# Patient Record
Sex: FEMALE | Race: BLACK OR AFRICAN AMERICAN | NOT HISPANIC OR LATINO | URBAN - METROPOLITAN AREA
[De-identification: names, ages, dates, MRNs, and addresses within clinical notes are randomized per-mention and may not be internally consistent; named-entity substitution may affect disease eponyms.]

---

## 2017-05-26 ENCOUNTER — INPATIENT (INPATIENT)
Facility: HOSPITAL | Age: 57
LOS: 6 days | Discharge: PSYCHIATRIC OR SUBSTANCE USE FACILITY | End: 2017-06-02
Attending: INTERNAL MEDICINE | Admitting: INTERNAL MEDICINE
Payer: MEDICARE

## 2017-05-26 VITALS
OXYGEN SATURATION: 100 % | HEART RATE: 60 BPM | SYSTOLIC BLOOD PRESSURE: 117 MMHG | RESPIRATION RATE: 16 BRPM | DIASTOLIC BLOOD PRESSURE: 63 MMHG | TEMPERATURE: 99 F

## 2017-05-26 LAB
ALBUMIN SERPL ELPH-MCNC: 4 G/DL — SIGNIFICANT CHANGE UP (ref 3.3–5)
ALP SERPL-CCNC: 81 U/L — SIGNIFICANT CHANGE UP (ref 40–120)
ALT FLD-CCNC: 21 U/L — SIGNIFICANT CHANGE UP (ref 4–33)
AST SERPL-CCNC: 29 U/L — SIGNIFICANT CHANGE UP (ref 4–32)
BASOPHILS # BLD AUTO: 0.05 K/UL — SIGNIFICANT CHANGE UP (ref 0–0.2)
BASOPHILS NFR BLD AUTO: 0.5 % — SIGNIFICANT CHANGE UP (ref 0–2)
BILIRUB SERPL-MCNC: 0.3 MG/DL — SIGNIFICANT CHANGE UP (ref 0.2–1.2)
BUN SERPL-MCNC: 36 MG/DL — HIGH (ref 7–23)
CALCIUM SERPL-MCNC: 9.1 MG/DL — SIGNIFICANT CHANGE UP (ref 8.4–10.5)
CHLORIDE SERPL-SCNC: 96 MMOL/L — LOW (ref 98–107)
CK MB BLD-MCNC: 1.8 — SIGNIFICANT CHANGE UP (ref 0–2.5)
CK MB BLD-MCNC: 4.29 NG/ML — SIGNIFICANT CHANGE UP (ref 1–4.7)
CK SERPL-CCNC: 235 U/L — HIGH (ref 25–170)
CO2 SERPL-SCNC: 34 MMOL/L — HIGH (ref 22–31)
CREAT SERPL-MCNC: 1.15 MG/DL — SIGNIFICANT CHANGE UP (ref 0.5–1.3)
EOSINOPHIL # BLD AUTO: 0.22 K/UL — SIGNIFICANT CHANGE UP (ref 0–0.5)
EOSINOPHIL NFR BLD AUTO: 2.1 % — SIGNIFICANT CHANGE UP (ref 0–6)
GLUCOSE SERPL-MCNC: 105 MG/DL — HIGH (ref 70–99)
HCT VFR BLD CALC: 41.5 % — SIGNIFICANT CHANGE UP (ref 34.5–45)
HGB BLD-MCNC: 13.1 G/DL — SIGNIFICANT CHANGE UP (ref 11.5–15.5)
IMM GRANULOCYTES NFR BLD AUTO: 0.2 % — SIGNIFICANT CHANGE UP (ref 0–1.5)
LIDOCAIN IGE QN: 96.5 U/L — HIGH (ref 7–60)
LYMPHOCYTES # BLD AUTO: 29.1 % — SIGNIFICANT CHANGE UP (ref 13–44)
LYMPHOCYTES # BLD AUTO: 3.03 K/UL — SIGNIFICANT CHANGE UP (ref 1–3.3)
MCHC RBC-ENTMCNC: 29.4 PG — SIGNIFICANT CHANGE UP (ref 27–34)
MCHC RBC-ENTMCNC: 31.6 % — LOW (ref 32–36)
MCV RBC AUTO: 93.3 FL — SIGNIFICANT CHANGE UP (ref 80–100)
MONOCYTES # BLD AUTO: 1.12 K/UL — HIGH (ref 0–0.9)
MONOCYTES NFR BLD AUTO: 10.7 % — SIGNIFICANT CHANGE UP (ref 2–14)
NEUTROPHILS # BLD AUTO: 5.98 K/UL — SIGNIFICANT CHANGE UP (ref 1.8–7.4)
NEUTROPHILS NFR BLD AUTO: 57.4 % — SIGNIFICANT CHANGE UP (ref 43–77)
PLATELET # BLD AUTO: 232 K/UL — SIGNIFICANT CHANGE UP (ref 150–400)
PMV BLD: 11.3 FL — SIGNIFICANT CHANGE UP (ref 7–13)
POTASSIUM SERPL-MCNC: 3.1 MMOL/L — LOW (ref 3.5–5.3)
POTASSIUM SERPL-SCNC: 3.1 MMOL/L — LOW (ref 3.5–5.3)
PROT SERPL-MCNC: 7.7 G/DL — SIGNIFICANT CHANGE UP (ref 6–8.3)
RBC # BLD: 4.45 M/UL — SIGNIFICANT CHANGE UP (ref 3.8–5.2)
RBC # FLD: 13.7 % — SIGNIFICANT CHANGE UP (ref 10.3–14.5)
SODIUM SERPL-SCNC: 141 MMOL/L — SIGNIFICANT CHANGE UP (ref 135–145)
TROPONIN T SERPL-MCNC: < 0.06 NG/ML — SIGNIFICANT CHANGE UP (ref 0–0.06)
WBC # BLD: 10.42 K/UL — SIGNIFICANT CHANGE UP (ref 3.8–10.5)
WBC # FLD AUTO: 10.42 K/UL — SIGNIFICANT CHANGE UP (ref 3.8–10.5)

## 2017-05-26 NOTE — ED ADULT NURSE NOTE - OBJECTIVE STATEMENT
Patient a&ox4, NAD, arrived to ED room 18. Medical history Afib on warfarin, asthma previously intubated, Patient a&ox4, NAD, arrived to ED room 18. Medical history Afib on warfarin, CHF, asthma previously intubated, open heart surgery, hernia repair. Patient states abdominal/chest pain/ belching/ distended x 1 month, appears distended, no increased pain with palpation. No SOB, dizziness, nausea, emesis, constipation, or diarrhea. IV placed, labs drawn and sent to lab. AFIB on cardiac monitor.

## 2017-05-26 NOTE — ED ADULT TRIAGE NOTE - CHIEF COMPLAINT QUOTE
Pt arrives w/ c/o gas pain to stomach and chest and she gets relief from belching. Denies nausea or vomiting.

## 2017-05-27 DIAGNOSIS — R07.9 CHEST PAIN, UNSPECIFIED: ICD-10-CM

## 2017-05-27 DIAGNOSIS — E87.6 HYPOKALEMIA: ICD-10-CM

## 2017-05-27 DIAGNOSIS — Z95.1 PRESENCE OF AORTOCORONARY BYPASS GRAFT: Chronic | ICD-10-CM

## 2017-05-27 DIAGNOSIS — I48.91 UNSPECIFIED ATRIAL FIBRILLATION: ICD-10-CM

## 2017-05-27 DIAGNOSIS — I24.9 ACUTE ISCHEMIC HEART DISEASE, UNSPECIFIED: ICD-10-CM

## 2017-05-27 DIAGNOSIS — Z29.9 ENCOUNTER FOR PROPHYLACTIC MEASURES, UNSPECIFIED: ICD-10-CM

## 2017-05-27 DIAGNOSIS — I50.9 HEART FAILURE, UNSPECIFIED: ICD-10-CM

## 2017-05-27 DIAGNOSIS — I25.10 ATHEROSCLEROTIC HEART DISEASE OF NATIVE CORONARY ARTERY WITHOUT ANGINA PECTORIS: ICD-10-CM

## 2017-05-27 LAB
APTT BLD: 47.7 SEC — HIGH (ref 27.5–37.4)
APTT BLD: 48.6 SEC — HIGH (ref 27.5–37.4)
BUN SERPL-MCNC: 34 MG/DL — HIGH (ref 7–23)
CALCIUM SERPL-MCNC: 9.4 MG/DL — SIGNIFICANT CHANGE UP (ref 8.4–10.5)
CHLORIDE SERPL-SCNC: 97 MMOL/L — LOW (ref 98–107)
CHOLEST SERPL-MCNC: 185 MG/DL — SIGNIFICANT CHANGE UP (ref 120–199)
CK MB BLD-MCNC: 3.94 NG/ML — SIGNIFICANT CHANGE UP (ref 1–4.7)
CK SERPL-CCNC: 231 U/L — HIGH (ref 25–170)
CO2 SERPL-SCNC: 31 MMOL/L — SIGNIFICANT CHANGE UP (ref 22–31)
CREAT SERPL-MCNC: 1.07 MG/DL — SIGNIFICANT CHANGE UP (ref 0.5–1.3)
GLUCOSE SERPL-MCNC: 105 MG/DL — HIGH (ref 70–99)
HBA1C BLD-MCNC: 6.3 % — HIGH (ref 4–5.6)
HCT VFR BLD CALC: 42.5 % — SIGNIFICANT CHANGE UP (ref 34.5–45)
HDLC SERPL-MCNC: 49 MG/DL — SIGNIFICANT CHANGE UP (ref 45–65)
HGB BLD-MCNC: 13.2 G/DL — SIGNIFICANT CHANGE UP (ref 11.5–15.5)
INR BLD: 2.67 — HIGH (ref 0.88–1.17)
INR BLD: 2.87 — HIGH (ref 0.88–1.17)
LIPID PNL WITH DIRECT LDL SERPL: 128 MG/DL — SIGNIFICANT CHANGE UP
MAGNESIUM SERPL-MCNC: 2.8 MG/DL — HIGH (ref 1.6–2.6)
MCHC RBC-ENTMCNC: 28.9 PG — SIGNIFICANT CHANGE UP (ref 27–34)
MCHC RBC-ENTMCNC: 31.1 % — LOW (ref 32–36)
MCV RBC AUTO: 93 FL — SIGNIFICANT CHANGE UP (ref 80–100)
NT-PROBNP SERPL-SCNC: 325.9 PG/ML — SIGNIFICANT CHANGE UP
PHOSPHATE SERPL-MCNC: 3.2 MG/DL — SIGNIFICANT CHANGE UP (ref 2.5–4.5)
PLATELET # BLD AUTO: 237 K/UL — SIGNIFICANT CHANGE UP (ref 150–400)
PMV BLD: 11.5 FL — SIGNIFICANT CHANGE UP (ref 7–13)
POTASSIUM SERPL-MCNC: 3.9 MMOL/L — SIGNIFICANT CHANGE UP (ref 3.5–5.3)
POTASSIUM SERPL-SCNC: 3.9 MMOL/L — SIGNIFICANT CHANGE UP (ref 3.5–5.3)
PROTHROM AB SERPL-ACNC: 30.5 SEC — HIGH (ref 9.8–13.1)
PROTHROM AB SERPL-ACNC: 32.8 SEC — HIGH (ref 9.8–13.1)
RBC # BLD: 4.57 M/UL — SIGNIFICANT CHANGE UP (ref 3.8–5.2)
RBC # FLD: 14.1 % — SIGNIFICANT CHANGE UP (ref 10.3–14.5)
SODIUM SERPL-SCNC: 141 MMOL/L — SIGNIFICANT CHANGE UP (ref 135–145)
TRIGL SERPL-MCNC: 142 MG/DL — SIGNIFICANT CHANGE UP (ref 10–149)
TROPONIN T SERPL-MCNC: < 0.06 NG/ML — SIGNIFICANT CHANGE UP (ref 0–0.06)
TSH SERPL-MCNC: 2.24 UIU/ML — SIGNIFICANT CHANGE UP (ref 0.27–4.2)
WBC # BLD: 7.8 K/UL — SIGNIFICANT CHANGE UP (ref 3.8–10.5)
WBC # FLD AUTO: 7.8 K/UL — SIGNIFICANT CHANGE UP (ref 3.8–10.5)

## 2017-05-27 PROCEDURE — 71020: CPT | Mod: 26

## 2017-05-27 RX ORDER — PANTOPRAZOLE SODIUM 20 MG/1
40 TABLET, DELAYED RELEASE ORAL EVERY 12 HOURS
Qty: 0 | Refills: 0 | Status: DISCONTINUED | OUTPATIENT
Start: 2017-05-27 | End: 2017-06-01

## 2017-05-27 RX ORDER — FUROSEMIDE 40 MG
40 TABLET ORAL DAILY
Qty: 0 | Refills: 0 | Status: DISCONTINUED | OUTPATIENT
Start: 2017-05-27 | End: 2017-06-02

## 2017-05-27 RX ORDER — PANTOPRAZOLE SODIUM 20 MG/1
40 TABLET, DELAYED RELEASE ORAL
Qty: 0 | Refills: 0 | Status: DISCONTINUED | OUTPATIENT
Start: 2017-05-27 | End: 2017-05-27

## 2017-05-27 RX ORDER — ASPIRIN/CALCIUM CARB/MAGNESIUM 324 MG
325 TABLET ORAL ONCE
Qty: 0 | Refills: 0 | Status: COMPLETED | OUTPATIENT
Start: 2017-05-27 | End: 2017-05-27

## 2017-05-27 RX ORDER — POTASSIUM CHLORIDE 20 MEQ
40 PACKET (EA) ORAL EVERY 4 HOURS
Qty: 0 | Refills: 0 | Status: COMPLETED | OUTPATIENT
Start: 2017-05-27 | End: 2017-05-27

## 2017-05-27 RX ORDER — ASPIRIN/CALCIUM CARB/MAGNESIUM 324 MG
81 TABLET ORAL DAILY
Qty: 0 | Refills: 0 | Status: DISCONTINUED | OUTPATIENT
Start: 2017-05-28 | End: 2017-06-02

## 2017-05-27 RX ORDER — WARFARIN SODIUM 2.5 MG/1
5 TABLET ORAL ONCE
Qty: 0 | Refills: 0 | Status: COMPLETED | OUTPATIENT
Start: 2017-05-27 | End: 2017-05-27

## 2017-05-27 RX ORDER — WARFARIN SODIUM 2.5 MG/1
1 TABLET ORAL
Qty: 0 | Refills: 0 | COMMUNITY

## 2017-05-27 RX ADMIN — Medication 40 MILLIEQUIVALENT(S): at 05:53

## 2017-05-27 RX ADMIN — Medication 325 MILLIGRAM(S): at 01:07

## 2017-05-27 RX ADMIN — WARFARIN SODIUM 5 MILLIGRAM(S): 2.5 TABLET ORAL at 17:09

## 2017-05-27 RX ADMIN — PANTOPRAZOLE SODIUM 40 MILLIGRAM(S): 20 TABLET, DELAYED RELEASE ORAL at 17:09

## 2017-05-27 RX ADMIN — Medication 40 MILLIGRAM(S): at 09:57

## 2017-05-27 RX ADMIN — Medication 40 MILLIEQUIVALENT(S): at 01:07

## 2017-05-27 NOTE — H&P ADULT - NSHPPHYSICALEXAM_GEN_ALL_CORE
GENERAL APPEARANCE: Well developed, well nourished, alert and cooperative, and appears to be in no acute distress.  HEAD: normocephalic.  EYES: PERRL, EOMI.   NECK: Neck supple, non-tender without lymphadenopathy, masses or thyromegaly.  CARDIAC: Normal S1 and S2. No S3, S4 or murmurs. Rhythm is regular. There is no peripheral edema, cyanosis or pallor. Extremities are warm and well perfused. Capillary refill is less than 2 seconds. No carotid bruits.  LUNGS: Clear to auscultation and percussion without rales, rhonchi, wheezing or diminished breath sounds.  ABDOMEN: Positive bowel sounds. Soft, nondistended, nontender. No guarding or rebound. No masses.  EXTREMITIES: No significant deformity or joint abnormality. No edema. Peripheral pulses intact.   NEUROLOGICAL: CN II-XII intact. Strength and sensation symmetric and intact throughout.   SKIN: Skin normal color, texture and turgor with no lesions or eruptions.  PSYCHIATRIC: The mental examination revealed the patient was oriented to person, place, and time. The patient was able to demonstrate good judgement and reason, without hallucinations, abnormal affect or abnormal behaviors during the examination. Patient is not suicidal.

## 2017-05-27 NOTE — H&P ADULT - ATTENDING COMMENTS
pt seen and examined  cv stable  pt with hx of CABG/AF admitted with chest pain, epigastric pain    jammie neg  ekg  af, no ischemic changes    vitals stable  nad  cv s1s2 irreg  lungs clear   abd distended soft  ext edema    A/P    CAD/CABG/AF    chest pain  abd distension    chest pain  brandon  nuclear stress test r/o new ischemic heart disease  asa  a/c for af  rate controlled  gi eval   ppi

## 2017-05-27 NOTE — ED PROVIDER NOTE - ATTENDING CONTRIBUTION TO CARE
att57 y/o f with h/o CAD, CABG, CHF, ef 20-25%, HLD, Afib on coumadin, asthma, presents with intermittent chest pain for 1 month and bloating. CP exertion, sob, cook. Belches with belatedness, but pt asking for food, no vomiting. No fever. Poor historian. Physicians in [place]. Exam as above. r/u acs, r/u chf, inr, asa, and admission. Will sign out patient.   I have personally performed a face to face bedside history and physical examination of this patient. I have discussed the history, examination, review of systems, assessment and plan of management with the resident. I have reviewed the electronic medical record and amended it to reflect my history, review of systems, physical exam, assessment and plan.

## 2017-05-27 NOTE — H&P ADULT - NSHPLABSRESULTS_GEN_ALL_CORE
13.1   10.42 )-----------( 232      ( 26 May 2017 22:30 )             41.5   05-26    141  |  96<L>  |  36<H>  ----------------------------<  105<H>  3.1<L>   |  34<H>  |  1.15    Ca    9.1      26 May 2017 22:30    TPro  7.7  /  Alb  4.0  /  TBili  0.3  /  DBili  x   /  AST  29  /  ALT  21  /  AlkPhos  81  05-26  CARDIAC MARKERS ( 26 May 2017 22:30 )  x     / < 0.06 ng/mL / 235 u/L / 4.29 ng/mL / x        EKG shows atrial fibrillation @ 65 bpm Qtc 461

## 2017-05-27 NOTE — H&P ADULT - ASSESSMENT
55 y/o F with h/o CAD s/p CABG, CHF, a. fib on coumadin, asthma presents to the ED for chset pain. Admit to telemetry to r/o ACS.

## 2017-05-27 NOTE — CONSULT NOTE ADULT - PROBLEM SELECTOR RECOMMENDATION 9
Patient with dyspepsia and history of cad.  Pt undergoing cardiac evaluation.  If negative, certainly possible this is gastritis/ reflux, nud.  I have recommended ppi bid.  If workup for cardiac negative may require endosocpy inpatient vs Outpatient.  Discussed with patient

## 2017-05-27 NOTE — H&P ADULT - HISTORY OF PRESENT ILLNESS
55 y/o F with h/o CAD, CABG, CHF, A. fib on coumadin, asthma presents to the ED for chest pain. Pt is a poor historian. Pt states she has been having intermittent chest pain for the past month. Pt report sthe chest pain is on the left side and is exertional and better with rest. Pt states it is associated with shortness of breath. Pt also states she has been burping alot and it relieves her chest pain. Pt denies LOC, syncope, fever, chills, palpitations, numbness, peripheral edema, tingling, dysuria, urinary/bowel incontinence or any other complaints at this time.

## 2017-05-27 NOTE — ED PROVIDER NOTE - PMH
Afib    CAD (coronary artery disease)    CHF (congestive heart failure) Afib    Asthma    CAD (coronary artery disease)    CHF (congestive heart failure)

## 2017-05-27 NOTE — H&P ADULT - PROBLEM SELECTOR PLAN 1
Admit to telemetry.   Trend CE. Consider ischemic evaluation.   EKG, cxr.  check cbc, tsh, lipid, hemoglobin a1c, bmp with mag and phos.   f/u MD note

## 2017-05-27 NOTE — CONSULT NOTE ADULT - SUBJECTIVE AND OBJECTIVE BOX
Patient is a 56y Female     Patient is a 56y old  Female who presents with a chief complaint of chest pain (27 May 2017 05:08)      HPI:  55 y/o F with h/o CAD, CABG, CHF, A. fib on coumadin, asthma presents to the ED for chest pain. Pt is a poor historian. Pt states she has been having intermittent chest pain for the past month. Pt report sthe chest pain is on the left side and is exertional and better with rest. Pt states it is associated with shortness of breath. Pt also states she has been burping alot and it relieves her chest pain. Pt denies LOC, syncope, fever, chills, palpitations, numbness, peripheral edema, tingling, dysuria, urinary/bowel incontinence or any other complaints at this time. (27 May 2017 05:08)      PAST MEDICAL & SURGICAL HISTORY:  Asthma  CAD (coronary artery disease)  Afib  CHF (congestive heart failure)  S/P CABG (coronary artery bypass graft)      MEDICATIONS  (STANDING):  furosemide    Tablet 40milliGRAM(s) Oral daily  warfarin 5milliGRAM(s) Oral once  pantoprazole  Injectable 40milliGRAM(s) IV Push every 12 hours      Allergies    No Known Allergies    Intolerances        SOCIAL HISTORY:  Denies ETOh,Smoking,     FAMILY HISTORY:  No pertinent family history in first degree relatives      REVIEW OF SYSTEMS:    CONSTITUTIONAL: No weakness, fevers or chills  EYES/ENT: No visual changes;  No vertigo or throat pain   NECK: No pain or stiffness  RESPIRATORY: No cough, wheezing, hemoptysis; No shortness of breath  CARDIOVASCULAR: No chest pain or palpitations  GASTROINTESTINAL: No abdominal or epigastric pain. No nausea, vomiting, or hematemesis; No diarrhea or constipation. No melena or hematochezia.  GENITOURINARY: No dysuria, frequency or hematuria  NEUROLOGICAL: No numbness or weakness  SKIN: No itching, burning, rashes, or lesions   All other review of systems is negative unless indicated above.    VITAL:  T(C): , Max: 37 (05-26 @ 20:27)  T(F): , Max: 98.6 (05-26 @ 20:27)  HR: 59  BP: 99/66  BP(mean): --  RR: 16  SpO2: 100%  Wt(kg): --    I and O's:    I & Os for current day (as of 05-27 @ 16:19)  =============================================  IN: 240 ml / OUT: 0 ml / NET: 240 ml    Height (cm): 165.1 (05-26 @ 22:24)  Weight (kg): 74.8 (05-26 @ 22:24)  BMI (kg/m2): 27.4 (05-26 @ 22:24)  BSA (m2): 1.82 (05-26 @ 22:24)    PHYSICAL EXAM:    Constitutional: NAD  HEENT: PERRLA,   Neck: No JVD  Respiratory: CTA B/L  Cardiovascular: S1 and S2  Gastrointestinal: BS+, soft, NT/ND  Extremities: No peripheral edema  Neurological: A/O x 3, no focal deficits  Psychiatric: Normal mood, normal affect  : No Tapia  Skin: No rashes  Access: Not applicable  Back: No CVA tenderness    LABS:                        13.2   7.80  )-----------( 237      ( 27 May 2017 06:00 )             42.5     05-27    141  |  97<L>  |  34<H>  ----------------------------<  105<H>  3.9   |  31  |  1.07    Ca    9.4      27 May 2017 06:00  Phos  3.2     05-27  Mg     2.8     05-27    TPro  7.7  /  Alb  4.0  /  TBili  0.3  /  DBili  x   /  AST  29  /  ALT  21  /  AlkPhos  81  05-26          RADIOLOGY & ADDITIONAL STUDIES:

## 2017-05-27 NOTE — PATIENT PROFILE ADULT. - SOCIAL CONCERNS
The patient is 7 weeks post Right Total Knee Replacement.  The patient reports doing well with less pain requiring decreased  narcotic usage.    Physical Examination  VITALS: Blood pressure 124/78, pulse 94, temperature 98.2 °F (36.8 °C), temperature source Oral, weight 86.2 kg.  The incision is dry and healing well per primum.  Range of motion 5 to 120 degrees.    The distal examination is intact.  There is swelling of none.  Calves are soft .    Radiographs show good implant position and alignment.    Impression:   Doing well post replacement as above.    Plan:  Continue physical therapy program as ordered, ween to hep as ayden.  The patient should follow up in 1 years' time for range of motion check, xrays are needed, sooner if needed.  Prescription refill as noted.        Per [place] law, The [place] Prescription Drug Monitoring website was visited, via [employer], to ensure compliance with [place] state statue.    The system was not functioning.    Rx is provided.  Patient instructed not share with or take other persons prescription medications.       None

## 2017-05-27 NOTE — ED PROVIDER NOTE - OBJECTIVE STATEMENT
56 F with PMH of CAD, CABG, AFIB on coumadin, asthma, p/w CP and bloating.  Patient poor historian, but says she has on and off CP for the last month. Pain is exertional and non exertional at times, not associated with diaphoresis but she does endorse HARDY.  Also says she has been feeling bloated and belches 56 F with PMH of CAD, CABG, CHF, AFIB on coumadin, asthma, p/w CP and bloating.  Patient poor historian, but says she has on and off CP for the last month. Pain is exertional and non exertional at times, not associated with diaphoresis but she does endorse HARDY.  Also says she has been feeling bloated and belches which makes her feel better. +BM and flatus.

## 2017-05-27 NOTE — H&P ADULT - NSHPREVIEWOFSYSTEMS_GEN_ALL_CORE
Constitutional: No fever, fatigue or weight loss.  Skin: No rash.  Eyes: No recent vision problems or eye pain.  ENT: No congestion, ear pain, or sore throat.  Endocrine: No thyroid problems.  Cardiovascular: + chest pain. No palpation.  Respiratory: No cough, shortness of breath, congestion, or wheezing.  Gastrointestinal: No abdominal pain, nausea, vomiting, or diarrhea.  Genitourinary: No dysuria.  Musculoskeletal: No joint swelling.  Neurologic: No headache.

## 2017-05-27 NOTE — ED PROVIDER NOTE - PSH
S/P CABG (coronary artery bypass graft) S/P CABG (coronary artery bypass graft)    S/P hip replacement, left    S/P thyroid surgery

## 2017-05-27 NOTE — ED PROVIDER NOTE - CARE PLAN
Principal Discharge DX:	Chest pain Principal Discharge DX:	Chest pain  Secondary Diagnosis:	CAD (coronary artery disease)  Secondary Diagnosis:	CHF (congestive heart failure)

## 2017-05-28 LAB
BUN SERPL-MCNC: 31 MG/DL — HIGH (ref 7–23)
CALCIUM SERPL-MCNC: 10 MG/DL — SIGNIFICANT CHANGE UP (ref 8.4–10.5)
CHLORIDE SERPL-SCNC: 96 MMOL/L — LOW (ref 98–107)
CO2 SERPL-SCNC: 34 MMOL/L — HIGH (ref 22–31)
CREAT SERPL-MCNC: 1.13 MG/DL — SIGNIFICANT CHANGE UP (ref 0.5–1.3)
GLUCOSE SERPL-MCNC: 99 MG/DL — SIGNIFICANT CHANGE UP (ref 70–99)
HCT VFR BLD CALC: 41.2 % — SIGNIFICANT CHANGE UP (ref 34.5–45)
HGB BLD-MCNC: 12.7 G/DL — SIGNIFICANT CHANGE UP (ref 11.5–15.5)
INR BLD: 3.03 — HIGH (ref 0.88–1.17)
MAGNESIUM SERPL-MCNC: 2.1 MG/DL — SIGNIFICANT CHANGE UP (ref 1.6–2.6)
MCHC RBC-ENTMCNC: 28.7 PG — SIGNIFICANT CHANGE UP (ref 27–34)
MCHC RBC-ENTMCNC: 30.8 % — LOW (ref 32–36)
MCV RBC AUTO: 93.2 FL — SIGNIFICANT CHANGE UP (ref 80–100)
PLATELET # BLD AUTO: 222 K/UL — SIGNIFICANT CHANGE UP (ref 150–400)
PMV BLD: 11.2 FL — SIGNIFICANT CHANGE UP (ref 7–13)
POTASSIUM SERPL-MCNC: 2.9 MMOL/L — CRITICAL LOW (ref 3.5–5.3)
POTASSIUM SERPL-MCNC: 3.5 MMOL/L — SIGNIFICANT CHANGE UP (ref 3.5–5.3)
POTASSIUM SERPL-SCNC: 2.9 MMOL/L — CRITICAL LOW (ref 3.5–5.3)
POTASSIUM SERPL-SCNC: 3.5 MMOL/L — SIGNIFICANT CHANGE UP (ref 3.5–5.3)
PROTHROM AB SERPL-ACNC: 34.7 SEC — HIGH (ref 9.8–13.1)
RBC # BLD: 4.42 M/UL — SIGNIFICANT CHANGE UP (ref 3.8–5.2)
RBC # FLD: 13.8 % — SIGNIFICANT CHANGE UP (ref 10.3–14.5)
SODIUM SERPL-SCNC: 140 MMOL/L — SIGNIFICANT CHANGE UP (ref 135–145)
WBC # BLD: 8.52 K/UL — SIGNIFICANT CHANGE UP (ref 3.8–10.5)
WBC # FLD AUTO: 8.52 K/UL — SIGNIFICANT CHANGE UP (ref 3.8–10.5)

## 2017-05-28 PROCEDURE — 78452 HT MUSCLE IMAGE SPECT MULT: CPT | Mod: 26

## 2017-05-28 PROCEDURE — 93018 CV STRESS TEST I&R ONLY: CPT | Mod: GC

## 2017-05-28 PROCEDURE — 93306 TTE W/DOPPLER COMPLETE: CPT | Mod: 26

## 2017-05-28 PROCEDURE — 93016 CV STRESS TEST SUPVJ ONLY: CPT | Mod: GC

## 2017-05-28 RX ORDER — WARFARIN SODIUM 2.5 MG/1
1 TABLET ORAL ONCE
Qty: 0 | Refills: 0 | Status: COMPLETED | OUTPATIENT
Start: 2017-05-28 | End: 2017-05-28

## 2017-05-28 RX ORDER — POTASSIUM CHLORIDE 20 MEQ
40 PACKET (EA) ORAL EVERY 4 HOURS
Qty: 0 | Refills: 0 | Status: COMPLETED | OUTPATIENT
Start: 2017-05-28 | End: 2017-05-28

## 2017-05-28 RX ORDER — POTASSIUM CHLORIDE 20 MEQ
10 PACKET (EA) ORAL
Qty: 0 | Refills: 0 | Status: COMPLETED | OUTPATIENT
Start: 2017-05-28 | End: 2017-05-28

## 2017-05-28 RX ADMIN — WARFARIN SODIUM 1 MILLIGRAM(S): 2.5 TABLET ORAL at 17:19

## 2017-05-28 RX ADMIN — Medication 40 MILLIEQUIVALENT(S): at 10:25

## 2017-05-28 RX ADMIN — Medication 40 MILLIEQUIVALENT(S): at 15:17

## 2017-05-28 RX ADMIN — Medication 100 MILLIEQUIVALENT(S): at 12:10

## 2017-05-28 RX ADMIN — PANTOPRAZOLE SODIUM 40 MILLIGRAM(S): 20 TABLET, DELAYED RELEASE ORAL at 05:34

## 2017-05-28 RX ADMIN — Medication 30 MILLILITER(S): at 23:06

## 2017-05-28 RX ADMIN — PANTOPRAZOLE SODIUM 40 MILLIGRAM(S): 20 TABLET, DELAYED RELEASE ORAL at 17:19

## 2017-05-28 RX ADMIN — Medication 81 MILLIGRAM(S): at 15:17

## 2017-05-28 RX ADMIN — Medication 100 MILLIEQUIVALENT(S): at 13:10

## 2017-05-28 RX ADMIN — Medication 40 MILLIGRAM(S): at 05:34

## 2017-05-28 RX ADMIN — Medication 100 MILLIEQUIVALENT(S): at 10:11

## 2017-05-28 NOTE — CONSULT NOTE ADULT - ASSESSMENT
55 y/o F with h/o CAD, CABG, CHF, A. fib on coumadin, asthma presents to the ED for chest pain. Pt is a poor historian. Pt states she has been having intermittent chest pain for the past month. Pt report sthe chest pain is on the left side and is exertional and better with rest. Pt states it is associated with shortness of breath. Pt also states she has been burping alot and it relieves her chest pain. Pt denies LOC, syncope, fever, chills, palpitations, numbness, peripheral edema, tingling, dysuria, urinary/bowel incontinence or any other complaints at this time.    Problem/Plan - 1:  ·  Problem: ACS (acute coronary syndrome).  Plan: Admit to telemetry.   Ischemia evaluation as per cards    Problem/Plan - 2:  ·  Problem: Hypokalemia.  Plan: Continue to monitor potassium levels.   Potassium chloride 40 meq was given. Replete as required    Problem/Plan - 3:  ·  Problem: CHF (congestive heart failure).  Plan: Continue with lasix 40 mg QD.  Strict I and O, daily weights.     Problem/Plan - 4:  ·  Problem: Afib.  Plan: [id number] score of 3. Pt is currently rate controlled. INR is therapeutic and warfarin is dosed.     Problem/Plan - 5:  ·  Problem: CAD (coronary artery disease).  Plan: Patient is not on ASA. Continue with DASH diet.     Problem/Plan - 6:  Problem: Need for prophylactic measure. Plan: Pt is anticoagulated with warfarin.

## 2017-05-28 NOTE — PROGRESS NOTE ADULT - SUBJECTIVE AND OBJECTIVE BOX
CARDIOLOGY FOLLOW UP NOTE - DR. FRTIZ    Subjective:  no active chest pain/sob    PHYSICAL EXAM:  T(C): 36.7, Max: 37 (05-27 @ 21:56)  HR: 56 (56 - 61)  BP: 103/67 (99/66 - 109/68)  RR: 16 (16 - 16)  SpO2: 97% (97% - 100%)  Wt(kg): --  I&O's Summary    I & Os for current day (as of 28 May 2017 10:09)  =============================================  IN: 480 ml / OUT: 0 ml / NET: 480 ml      Appearance: Normal	  Cardiovascular: Normal S1 S2,RRR, No JVD, No murmurs  Respiratory: Lungs clear to auscultation	  Gastrointestinal:  Soft, Non-tender, + BS	  Extremities: Normal range of motion, No clubbing, cyanosis or edema    MEDICATIONS  (STANDING):  furosemide    Tablet 40milliGRAM(s) Oral daily  aspirin enteric coated 81milliGRAM(s) Oral daily  pantoprazole  Injectable 40milliGRAM(s) IV Push every 12 hours  potassium chloride  10 mEq/100 mL IVPB 10milliEquivalent(s) IV Intermittent every 1 hour  potassium chloride    Tablet ER 40milliEquivalent(s) Oral every 4 hours  warfarin 1milliGRAM(s) Oral once      TELEMETRY: 	    ECG:  	  RADIOLOGY:   DIAGNOSTIC TESTING:  [ ] Echocardiogram:  [ ] Catheterization:  [ ] Stress Test:    OTHER: 	    LABS:	 	    CARDIAC MARKERS:                                12.7   8.52  )-----------( 222      ( 28 May 2017 06:10 )             41.2     05-28    140  |  96<L>  |  31<H>  ----------------------------<  99  2.9<LL>   |  34<H>  |  1.13    Ca    10.0      28 May 2017 06:10  Phos  3.2     05-27  Mg     2.1     05-28    TPro  7.7  /  Alb  4.0  /  TBili  0.3  /  DBili  x   /  AST  29  /  ALT  21  /  AlkPhos  81  05-26    proBNP:   PT/INR - ( 28 May 2017 06:10 )   PT: 34.7 SEC;   INR: 3.03          PTT - ( 27 May 2017 10:20 )  PTT:48.6 SEC  Lipid Profile:   HgA1c:

## 2017-05-28 NOTE — CONSULT NOTE ADULT - SUBJECTIVE AND OBJECTIVE BOX
Chief Complaint/Reason for Admission: chest pain	  History of Present Illness: 	  55 y/o F with h/o CAD, CABG, CHF, A. fib on coumadin, asthma presents to the ED for chest pain. Pt is a poor historian. Pt states she has been having intermittent chest pain for the past month. Pt report sthe chest pain is on the left side and is exertional and better with rest. Pt states it is associated with shortness of breath. Pt also states she has been burping alot and it relieves her chest pain. Pt denies LOC, syncope, fever, chills, palpitations, numbness, peripheral edema, tingling, dysuria, urinary/bowel incontinence or any other complaints at this time.  Review of Systems:  Review of Systems: Constitutional: No fever, fatigue or weight loss. Skin: No rash. Eyes: No recent vision problems or eye pain. ENT: No congestion, ear pain, or sore throat. Endocrine: No thyroid problems. Cardiovascular: + chest pain. No palpation. Respiratory: No cough, shortness of breath, congestion, or wheezing. Gastrointestinal: No abdominal pain, nausea, vomiting, or diarrhea. Genitourinary: No dysuria. Musculoskeletal: No joint swelling. Neurologic: No headache.	      Allergies and Intolerances:        Allergies:  	No Known Allergies:     Home Medications:   * Patient Currently Takes Medications as of 27-May-2017 03:07 documented in Prescription Writer  · 	warfarin 5 mg oral tablet: 1 tab(s) orally once a day, Last Dose Taken:    · 	Lasix 40 mg oral tablet: 1 tab(s) orally once a day, Last Dose Taken:  Patient History:   Past Medical History:  Afib    Asthma    CAD (coronary artery disease)    CHF (congestive heart failure).    Past Surgical History:  S/P CABG (coronary artery bypass graft).    Family History:  No pertinent family history in first degree relatives.Social History:  Social History (marital status, living situation, occupation, tobacco use, alcohol and drug use, and sexual history): denies smoking, alcohol or drug use.	  Physical Exam:  Physical Exam: GENERAL APPEARANCE: Well developed, well nourished, alert and cooperative, and appears to be in no acute distress. HEAD: normocephalic. EYES: PERRL, EOMI.  NECK: Neck supple, non-tender without lymphadenopathy, masses or thyromegaly. CARDIAC: Normal S1 and S2. No S3, S4 or murmurs. Rhythm is regular. There is no peripheral edema, cyanosis or pallor. Extremities are warm and well perfused. Capillary refill is less than 2 seconds. No carotid bruits. LUNGS: Clear to auscultation and percussion without rales, rhonchi, wheezing or diminished breath sounds. ABDOMEN: Positive bowel sounds. Soft, nondistended, nontender. No guarding or rebound. No masses. EXTREMITIES: No significant deformity or joint abnormality. No edema. Peripheral pulses intact.  NEUROLOGICAL: CN II-XII intact. Strength and sensation symmetric and intact throughout.  SKIN: Skin normal color, texture and turgor with no lesions or eruptions. PSYCHIATRIC: The mental examination revealed the patient was oriented to person, place, and time. The patient was able to demonstrate good judgement and reason, without hallucinations, abnormal affect or abnormal behaviors during the examination. Patient is not suicidal.	  Labs, Radiology, Cardiology, and Other Results: 13.1  10.42 )-----------( 232      ( 26 May 2017 22:30 )            41.5  05-26  141  |  96<L>  |  36<H> ----------------------------<  105<H> 3.1<L>   |  34<H>  |  1.15  Ca    9.1      26 May 2017 22:30  TPro  7.7  /  Alb  4.0  /  TBili  0.3  /  DBili  x   /  AST  29  /  ALT  21  /  AlkPhos  81  05-26 CARDIAC MARKERS ( 26 May 2017 22:30 ) x     / < 0.06 ng/mL / 235 u/L / 4.29 ng/mL / x      EKG shows atrial fibrillation @ 65 bpm Qtc 461

## 2017-05-29 DIAGNOSIS — Z96.642 PRESENCE OF LEFT ARTIFICIAL HIP JOINT: Chronic | ICD-10-CM

## 2017-05-29 DIAGNOSIS — Z98.890 OTHER SPECIFIED POSTPROCEDURAL STATES: Chronic | ICD-10-CM

## 2017-05-29 LAB
BUN SERPL-MCNC: 34 MG/DL — HIGH (ref 7–23)
CALCIUM SERPL-MCNC: 10.2 MG/DL — SIGNIFICANT CHANGE UP (ref 8.4–10.5)
CHLORIDE SERPL-SCNC: 98 MMOL/L — SIGNIFICANT CHANGE UP (ref 98–107)
CO2 SERPL-SCNC: 28 MMOL/L — SIGNIFICANT CHANGE UP (ref 22–31)
CREAT SERPL-MCNC: 1.12 MG/DL — SIGNIFICANT CHANGE UP (ref 0.5–1.3)
GLUCOSE SERPL-MCNC: 92 MG/DL — SIGNIFICANT CHANGE UP (ref 70–99)
INR BLD: 2.52 — HIGH (ref 0.88–1.17)
MAGNESIUM SERPL-MCNC: 2.1 MG/DL — SIGNIFICANT CHANGE UP (ref 1.6–2.6)
POTASSIUM SERPL-MCNC: 3.9 MMOL/L — SIGNIFICANT CHANGE UP (ref 3.5–5.3)
POTASSIUM SERPL-SCNC: 3.9 MMOL/L — SIGNIFICANT CHANGE UP (ref 3.5–5.3)
PROTHROM AB SERPL-ACNC: 28.8 SEC — HIGH (ref 9.8–13.1)
SODIUM SERPL-SCNC: 140 MMOL/L — SIGNIFICANT CHANGE UP (ref 135–145)

## 2017-05-29 RX ORDER — WARFARIN SODIUM 2.5 MG/1
5 TABLET ORAL ONCE
Qty: 0 | Refills: 0 | Status: COMPLETED | OUTPATIENT
Start: 2017-05-29 | End: 2017-05-29

## 2017-05-29 RX ADMIN — Medication 81 MILLIGRAM(S): at 12:18

## 2017-05-29 RX ADMIN — PANTOPRAZOLE SODIUM 40 MILLIGRAM(S): 20 TABLET, DELAYED RELEASE ORAL at 06:07

## 2017-05-29 RX ADMIN — Medication 40 MILLIGRAM(S): at 06:07

## 2017-05-29 RX ADMIN — PANTOPRAZOLE SODIUM 40 MILLIGRAM(S): 20 TABLET, DELAYED RELEASE ORAL at 17:41

## 2017-05-29 RX ADMIN — WARFARIN SODIUM 5 MILLIGRAM(S): 2.5 TABLET ORAL at 17:41

## 2017-05-29 NOTE — PROGRESS NOTE ADULT - SUBJECTIVE AND OBJECTIVE BOX
Patient is a 56y Female     Patient is a 56y old  Female who presents with a chief complaint of chest pain (27 May 2017 05:08)      HPI:  55 y/o F with h/o CAD, CABG, CHF, A. fib on coumadin, asthma presents to the ED for chest pain. Pt is a poor historian. Pt states she has been having intermittent chest pain for the past month. Pt report sthe chest pain is on the left side and is exertional and better with rest. Pt states it is associated with shortness of breath. Pt also states she has been burping alot and it relieves her chest pain. Pt denies LOC, syncope, fever, chills, palpitations, numbness, peripheral edema, tingling, dysuria, urinary/bowel incontinence or any other complaints at this time. (27 May 2017 05:08)      PAST MEDICAL & SURGICAL HISTORY:  Asthma  CAD (coronary artery disease)  Afib  CHF (congestive heart failure)  S/P CABG (coronary artery bypass graft)      MEDICATIONS  (STANDING):  furosemide    Tablet 40milliGRAM(s) Oral daily  aspirin enteric coated 81milliGRAM(s) Oral daily  pantoprazole  Injectable 40milliGRAM(s) IV Push every 12 hours      Allergies    No Known Allergies    Intolerances        SOCIAL HISTORY:  Denies ETOh,Smoking,     FAMILY HISTORY:  No pertinent family history in first degree relatives      REVIEW OF SYSTEMS:    CONSTITUTIONAL: No weakness, fevers or chills  EYES/ENT: No visual changes;  No vertigo or throat pain   NECK: No pain or stiffness  RESPIRATORY: No cough, wheezing, hemoptysis; No shortness of breath  CARDIOVASCULAR: No chest pain or palpitations  GASTROINTESTINAL: No abdominal or epigastric pain. No nausea, vomiting, or hematemesis; No diarrhea or constipation. No melena or hematochezia.  GENITOURINARY: No dysuria, frequency or hematuria  NEUROLOGICAL: No numbness or weakness  SKIN: No itching, burning, rashes, or lesions   All other review of systems is negative unless indicated above.    VITAL:  T(C): , Max: 37.3 (05-28 @ 19:25)  T(F): , Max: 99.2 (05-28 @ 19:25)  HR: 80  BP: 104/79  BP(mean): --  RR: 12  SpO2: 97%  Wt(kg): --    I and O's:  I & Os for 24h ending 05-28 @ 07:00  =============================================  IN: 480 ml / OUT: 0 ml / NET: 480 ml    I & Os for current day (as of 05-29 @ 08:39)  =============================================  IN: 660 ml / OUT: 0 ml / NET: 660 ml        PHYSICAL EXAM:    Constitutional: NAD  HEENT: PERRLA,   Neck: No JVD  Respiratory: CTA B/L  Cardiovascular: S1 and S2  Gastrointestinal: BS+, soft, NT/ND  Extremities: No peripheral edema  Neurological: A/O x 3, no focal deficits  Psychiatric: Normal mood, normal affect  : No Tapia  Skin: No rashes  Access: Not applicable  Back: No CVA tenderness    LABS:                        12.7   8.52  )-----------( 222      ( 28 May 2017 06:10 )             41.2     05-28    x   |  x   |  x   ----------------------------<  x   3.5   |  x   |  x     Ca    10.0      28 May 2017 06:10  Mg     2.1     05-28            RADIOLOGY & ADDITIONAL STUDIES:

## 2017-05-29 NOTE — PROGRESS NOTE ADULT - SUBJECTIVE AND OBJECTIVE BOX
CARDIOLOGY FOLLOW UP NOTE - DR. FRITZ    Subjective:  no chest pain/sob    PHYSICAL EXAM:  T(C): 36.8, Max: 37.3 (05-28 @ 19:25)  HR: 80 (70 - 80)  BP: 104/79 (95/51 - 110/67)  RR: 12 (12 - 16)  SpO2: 97% (97% - 100%)  Wt(kg): --  I&O's Summary    I & Os for current day (as of 29 May 2017 09:42)  =============================================  IN: 660 ml / OUT: 0 ml / NET: 660 ml      Appearance: Normal	  Cardiovascular: Normal S1 S2,RRR, No JVD, No murmurs  Respiratory: Lungs clear to auscultation	  Gastrointestinal:  Soft, Non-tender, + BS	  Extremities: Normal range of motion, No clubbing, cyanosis or edema    MEDICATIONS  (STANDING):  furosemide    Tablet 40milliGRAM(s) Oral daily  aspirin enteric coated 81milliGRAM(s) Oral daily  pantoprazole  Injectable 40milliGRAM(s) IV Push every 12 hours  warfarin 5milliGRAM(s) Oral once      TELEMETRY: 	    ECG:  	  RADIOLOGY:   DIAGNOSTIC TESTING:  [ ] Echocardiogram:  [ ] Catheterization:  [ ] Stress Test:    OTHER: 	    LABS:	 	    CARDIAC MARKERS:                                12.7   8.52  )-----------( 222      ( 28 May 2017 06:10 )             41.2     05-29    140  |  98  |  34<H>  ----------------------------<  92  3.9   |  28  |  1.12    Ca    10.2      29 May 2017 07:30  Mg     2.1     05-29      proBNP:   PT/INR - ( 29 May 2017 07:30 )   PT: 28.8 SEC;   INR: 2.52          PTT - ( 27 May 2017 10:20 )  PTT:48.6 SEC  Lipid Profile:   HgA1c:

## 2017-05-29 NOTE — PROGRESS NOTE ADULT - SUBJECTIVE AND OBJECTIVE BOX
Patient is a 56y old  Female who presents with a chief complaint of chest pain (27 May 2017 05:08)      INTERVAL HPI/OVERNIGHT EVENTS:  T(C): 36.8, Max: 37.3 (05-28 @ 19:25)  HR: 80 (70 - 80)  BP: 104/79 (95/51 - 110/67)  RR: 12 (12 - 16)  SpO2: 97% (97% - 100%)  Wt(kg): --  I&O's Summary  I & Os for 24h ending 29 May 2017 07:00  =============================================  IN: 660 ml / OUT: 0 ml / NET: 660 ml    I & Os for current day (as of 29 May 2017 11:48)  =============================================  IN: 480 ml / OUT: 0 ml / NET: 480 ml      LABS:                        12.7   8.52  )-----------( 222      ( 28 May 2017 06:10 )             41.2     05-29    140  |  98  |  34<H>  ----------------------------<  92  3.9   |  28  |  1.12    Ca    10.2      29 May 2017 07:30  Mg     2.1     05-29      PT/INR - ( 29 May 2017 07:30 )   PT: 28.8 SEC;   INR: 2.52              CAPILLARY BLOOD GLUCOSE            MEDICATIONS  (STANDING):  furosemide    Tablet 40milliGRAM(s) Oral daily  aspirin enteric coated 81milliGRAM(s) Oral daily  pantoprazole  Injectable 40milliGRAM(s) IV Push every 12 hours  warfarin 5milliGRAM(s) Oral once    MEDICATIONS  (PRN):      REVIEW OF SYSTEMS:  CONSTITUTIONAL: No fever, weight loss, or fatigue  EYES: No eye pain, visual disturbances, or discharge  ENMT:  No difficulty hearing, tinnitus, vertigo; No sinus or throat pain  NECK: No pain or stiffness  RESPIRATORY: No cough, wheezing, chills or hemoptysis; No shortness of breath  CARDIOVASCULAR: No chest pain, palpitations, dizziness, or leg swelling  GASTROINTESTINAL: No abdominal or epigastric pain. No nausea, vomiting, or hematemesis; No diarrhea or constipation. No melena or hematochezia.  GENITOURINARY: No dysuria, frequency, hematuria, or incontinence  NEUROLOGICAL: No headaches, memory loss, loss of strength, numbness, or tremors  SKIN: No itching, burning, rashes, or lesions   LYMPH NODES: No enlarged glands  ENDOCRINE: No heat or cold intolerance; No hair loss  MUSCULOSKELETAL: No joint pain or swelling; No muscle, back, or extremity pain  PSYCHIATRIC: No depression, anxiety, mood swings, or difficulty sleeping  HEME/LYMPH: No easy bruising, or bleeding gums  ALLERY AND IMMUNOLOGIC: No hives or eczema    RADIOLOGY & ADDITIONAL TESTS:    Imaging Personally Reviewed:  [ ] YES  [ ] NO    Consultant(s) Notes Reviewed:  [ ] YES  [ ] NO    PHYSICAL EXAM:  GENERAL: NAD, well-groomed, well-developed  HEAD:  Atraumatic, Normocephalic  EYES: EOMI, PERRLA, conjunctiva and sclera clear  ENMT: No tonsillar erythema, exudates, or enlargement; Moist mucous membranes, Good dentition, No lesions  NECK: Supple, No JVD, Normal thyroid  NERVOUS SYSTEM:  Alert & Oriented X3, Good concentration; Motor Strength 5/5 B/L upper and lower extremities; DTRs 2+ intact and symmetric  CHEST/LUNG: Clear to percussion bilaterally; No rales, rhonchi, wheezing, or rubs  HEART: Regular rate and rhythm; No murmurs, rubs, or gallops  ABDOMEN: Soft, Nontender, Nondistended; Bowel sounds present  EXTREMITIES:  2+ Peripheral Pulses, No clubbing, cyanosis, or edema  LYMPH: No lymphadenopathy noted  SKIN: No rashes or lesions    Care Discussed with Consultants/Other Providers [* ] YES  [ ] NO

## 2017-05-29 NOTE — PROGRESS NOTE ADULT - ASSESSMENT
·  Problem: ACS (acute coronary syndrome).  Plan: Admit to telemetry.   Ischemia evaluation as per cards    Problem/Plan - 2:  ·  Problem: Hypokalemia.  Plan: Continue to monitor potassium levels.   Potassium chloride 40 meq was given. Replete as required    Problem/Plan - 3:  ·  Problem: CHF (congestive heart failure).  Plan: Continue with lasix 40 mg QD.  Strict I and O, daily weights.     Problem/Plan - 4:  ·  Problem: Afib.  Plan: [id number] score of 3. Pt is currently rate controlled. INR is therapeutic and warfarin is dosed.     Problem/Plan - 5:  ·  Problem: CAD (coronary artery disease).  Plan: Patient is not on ASA. Continue with DASH diet.     Problem/Plan - 6:  Problem: Need for prophylactic measure. Plan: Pt is anticoagulated with warfarin.

## 2017-05-30 LAB
BUN SERPL-MCNC: 38 MG/DL — HIGH (ref 7–23)
CALCIUM SERPL-MCNC: 10.3 MG/DL — SIGNIFICANT CHANGE UP (ref 8.4–10.5)
CHLORIDE SERPL-SCNC: 95 MMOL/L — LOW (ref 98–107)
CO2 SERPL-SCNC: 33 MMOL/L — HIGH (ref 22–31)
CREAT SERPL-MCNC: 1.29 MG/DL — SIGNIFICANT CHANGE UP (ref 0.5–1.3)
GLUCOSE SERPL-MCNC: 104 MG/DL — HIGH (ref 70–99)
HCT VFR BLD CALC: 43.2 % — SIGNIFICANT CHANGE UP (ref 34.5–45)
HGB BLD-MCNC: 13.8 G/DL — SIGNIFICANT CHANGE UP (ref 11.5–15.5)
INR BLD: 2.35 — HIGH (ref 0.88–1.17)
MAGNESIUM SERPL-MCNC: 2.1 MG/DL — SIGNIFICANT CHANGE UP (ref 1.6–2.6)
MCHC RBC-ENTMCNC: 29.4 PG — SIGNIFICANT CHANGE UP (ref 27–34)
MCHC RBC-ENTMCNC: 31.9 % — LOW (ref 32–36)
MCV RBC AUTO: 92.1 FL — SIGNIFICANT CHANGE UP (ref 80–100)
PLATELET # BLD AUTO: 234 K/UL — SIGNIFICANT CHANGE UP (ref 150–400)
PMV BLD: 12.1 FL — SIGNIFICANT CHANGE UP (ref 7–13)
POTASSIUM SERPL-MCNC: 3.3 MMOL/L — LOW (ref 3.5–5.3)
POTASSIUM SERPL-SCNC: 3.3 MMOL/L — LOW (ref 3.5–5.3)
PROTHROM AB SERPL-ACNC: 26.8 SEC — HIGH (ref 9.8–13.1)
RBC # BLD: 4.69 M/UL — SIGNIFICANT CHANGE UP (ref 3.8–5.2)
RBC # FLD: 13.9 % — SIGNIFICANT CHANGE UP (ref 10.3–14.5)
SODIUM SERPL-SCNC: 140 MMOL/L — SIGNIFICANT CHANGE UP (ref 135–145)
WBC # BLD: 8.99 K/UL — SIGNIFICANT CHANGE UP (ref 3.8–10.5)
WBC # FLD AUTO: 8.99 K/UL — SIGNIFICANT CHANGE UP (ref 3.8–10.5)

## 2017-05-30 RX ORDER — WARFARIN SODIUM 2.5 MG/1
5 TABLET ORAL ONCE
Qty: 0 | Refills: 0 | Status: COMPLETED | OUTPATIENT
Start: 2017-05-30 | End: 2017-05-30

## 2017-05-30 RX ORDER — POTASSIUM CHLORIDE 20 MEQ
40 PACKET (EA) ORAL ONCE
Qty: 0 | Refills: 0 | Status: COMPLETED | OUTPATIENT
Start: 2017-05-30 | End: 2017-05-30

## 2017-05-30 RX ORDER — ASPIRIN/CALCIUM CARB/MAGNESIUM 324 MG
1 TABLET ORAL
Qty: 0 | Refills: 0 | COMMUNITY
Start: 2017-05-30

## 2017-05-30 RX ADMIN — Medication 81 MILLIGRAM(S): at 12:05

## 2017-05-30 RX ADMIN — PANTOPRAZOLE SODIUM 40 MILLIGRAM(S): 20 TABLET, DELAYED RELEASE ORAL at 17:24

## 2017-05-30 RX ADMIN — PANTOPRAZOLE SODIUM 40 MILLIGRAM(S): 20 TABLET, DELAYED RELEASE ORAL at 05:23

## 2017-05-30 RX ADMIN — Medication 40 MILLIGRAM(S): at 05:23

## 2017-05-30 RX ADMIN — Medication 40 MILLIEQUIVALENT(S): at 12:05

## 2017-05-30 RX ADMIN — Medication 30 MILLILITER(S): at 23:34

## 2017-05-30 RX ADMIN — WARFARIN SODIUM 5 MILLIGRAM(S): 2.5 TABLET ORAL at 17:24

## 2017-05-30 NOTE — PROGRESS NOTE ADULT - ASSESSMENT
57 y/o F with h/o CAD s/p CABG, CHF, a. fib on coumadin, asthma admitted with chest pain    -CV stable  -stress negative for ischemia  -no further cardiac workup at this time  -cont lasix  -coumadin per INR(CHADSVASC 3)  -pt optimized for EGD if planned, GI followup  -If EGD performed and results are negative can start low dose daily asa

## 2017-05-30 NOTE — DISCHARGE NOTE ADULT - NS AS DC VTE INSTRUCTION
Coumadin/Warfarin - Follow-up monitoring.../Coumadin/Warfarin - Potential for adverse drug reactions and interactions/Coumadin/Warfarin - Compliance.../Coumadin/Warfarin - Dietary Advice...

## 2017-05-30 NOTE — DISCHARGE NOTE ADULT - CARE PLAN
Principal Discharge DX:	Chest pain, unspecified type  Goal:	Please follow up with your primary care provider within 2 weeks call for an appointment  Instructions for follow-up, activity and diet:	Call your PMD AND GI for follow up and for screening colonoscopy. Principal Discharge DX:	Chest pain, unspecified type  Goal:	Please follow up with your primary care provider within 2 weeks call for an appointment  Instructions for follow-up, activity and diet:	Call your PMD AND GI for follow up and for screening colonoscopy.  Secondary Diagnosis:	CHF (congestive heart failure)  Goal:	Followup with PMD and take all medications prescribed.  Instructions for follow-up, activity and diet:	Followup with PMD and take all medications prescribed. Low salt, low fat, low cholesterol diet  Secondary Diagnosis:	Hypokalemia  Goal:	Followup with PMD and take all medications prescribed.  Instructions for follow-up, activity and diet:	Followup with PMD and take all medications prescribed. Low salt, low fat, low cholesterol diet  Secondary Diagnosis:	Afib  Goal:	Followup with PMD and take all medications prescribed.  Instructions for follow-up, activity and diet:	Followup with PMD and take all medications prescribed. Low salt, low fat, low cholesterol diet  Secondary Diagnosis:	Other depression  Goal:	Followup with PMD and take all medications prescribed.  Instructions for follow-up, activity and diet:	Followup with Psychiatry for depression.

## 2017-05-30 NOTE — PROGRESS NOTE ADULT - PROBLEM SELECTOR PLAN 1
no indication for endosocpic evalaution at this time.  patient requesting screening colon, will need off coumadin 5 days.  can be done as outpatient with primary gi doctor.

## 2017-05-30 NOTE — DISCHARGE NOTE ADULT - HOSPITAL COURSE
This is a 6 yo F admitted with CAD , CABG and CHF. Patient admitted for chest pain. Patient had EKG, CXR,LABS, tele monitoring for arrythmia . Cardiac enzymes negative x 2 . EKG with atrial fib. NST - normal study. Patient seen by GI Dr Barker and out patient colonoscopy with her GI doctor was recommended. Stable for discharge to home as per Dr Rodriguez. This is a 8 yo F admitted with CAD , CABG and CHF. Patient admitted for chest pain. Patient had EKG, CXR,LABS, tele monitoring for arrythmia . Cardiac enzymes negative x 2 . EKG with atrial fib. NST - normal study. Patient seen by GI Dr Barker and out patient colonoscopy with her GI doctor was recommended. 6/1- Colonoscopy- Impression: - One 5 mm polyp in the rectum, removed with a cold   biopsy forceps. Resected and retrieved. One 5 mm polyp in the ascending colon, removed with a  cold biopsy forceps. Resected and retrieved. Recommendation: Resume regular diet.  6/1- Endoscopy- Impression: - Normal esophagus. Normal stomach. Normal examined duodenum. No specimens collected. Recommendation:  Await pathology results.  Stable for discharge to [facility] as per Dr Rodriguez. Followup with Psychiatry for depression.

## 2017-05-30 NOTE — DISCHARGE NOTE ADULT - PLAN OF CARE
Please follow up with your primary care provider within 2 weeks call for an appointment Call your PMD AND GI for follow up and for screening colonoscopy. Followup with PMD and take all medications prescribed. Followup with PMD and take all medications prescribed. Low salt, low fat, low cholesterol diet Followup with Psychiatry for depression.

## 2017-05-30 NOTE — PROGRESS NOTE ADULT - SUBJECTIVE AND OBJECTIVE BOX
Patient is a 56y Female     Patient is a 56y old  Female who presents with a chief complaint of chest pain (27 May 2017 05:08)      HPI:  57 y/o F with h/o CAD, CABG, CHF, A. fib on coumadin, asthma presents to the ED for chest pain. Pt is a poor historian. Pt states she has been having intermittent chest pain for the past month. Pt report sthe chest pain is on the left side and is exertional and better with rest. Pt states it is associated with shortness of breath. Pt also states she has been burping alot and it relieves her chest pain. Pt denies LOC, syncope, fever, chills, palpitations, numbness, peripheral edema, tingling, dysuria, urinary/bowel incontinence or any other complaints at this time. (27 May 2017 05:08)      PAST MEDICAL & SURGICAL HISTORY:  Asthma  CAD (coronary artery disease)  Afib  CHF (congestive heart failure)  S/P thyroid surgery  S/P hip replacement, left  S/P CABG (coronary artery bypass graft)      MEDICATIONS  (STANDING):  furosemide    Tablet 40milliGRAM(s) Oral daily  aspirin enteric coated 81milliGRAM(s) Oral daily  pantoprazole  Injectable 40milliGRAM(s) IV Push every 12 hours      Allergies    No Known Allergies    Intolerances        SOCIAL HISTORY:  Denies ETOh,Smoking,     FAMILY HISTORY:  No pertinent family history in first degree relatives      REVIEW OF SYSTEMS:    CONSTITUTIONAL: No weakness, fevers or chills  EYES/ENT: No visual changes;  No vertigo or throat pain   NECK: No pain or stiffness  RESPIRATORY: No cough, wheezing, hemoptysis; No shortness of breath  CARDIOVASCULAR: No chest pain or palpitations  GASTROINTESTINAL: No abdominal or epigastric pain. No nausea, vomiting, or hematemesis; No diarrhea or constipation. No melena or hematochezia.  GENITOURINARY: No dysuria, frequency or hematuria  NEUROLOGICAL: No numbness or weakness  SKIN: No itching, burning, rashes, or lesions   All other review of systems is negative unless indicated above.    VITAL:  T(C): , Max: 37 (05-29 @ 13:25)  T(F): , Max: 98.6 (05-29 @ 13:25)  HR: 68  BP: 115/74  BP(mean): --  RR: 12  SpO2: 98%  Wt(kg): --    I and O's:  I & Os for 24h ending 05-29 @ 07:00  =============================================  IN: 660 ml / OUT: 0 ml / NET: 660 ml    I & Os for current day (as of 05-30 @ 07:02)  =============================================  IN: 540 ml / OUT: 0 ml / NET: 540 ml        PHYSICAL EXAM:    Constitutional: NAD  HEENT: PERRLA,   Neck: No JVD  Respiratory: CTA B/L  Cardiovascular: S1 and S2  Gastrointestinal: BS+, soft, NT/ND  Extremities: No peripheral edema  Neurological: A/O x 3, no focal deficits  Psychiatric: Normal mood, normal affect  : No Tapia  Skin: No rashes  Access: Not applicable  Back: No CVA tenderness    LABS:    05-29    140  |  98  |  34<H>  ----------------------------<  92  3.9   |  28  |  1.12    Ca    10.2      29 May 2017 07:30  Mg     2.1     05-29            RADIOLOGY & ADDITIONAL STUDIES:

## 2017-05-30 NOTE — DISCHARGE NOTE ADULT - PATIENT PORTAL LINK FT
“You can access the [facility] Patient Portal, offered by [facility], by registering with the following website: [email or web address]”

## 2017-05-30 NOTE — PROGRESS NOTE ADULT - SUBJECTIVE AND OBJECTIVE BOX
CC:    TELEMETRY: afib 50-70    PHYSICAL EXAM:    T(C): 36.6, Max: 37 (05-29 @ 13:25)  HR: 68 (68 - 93)  BP: 115/74 (100/60 - 115/74)  RR: 12 (12 - 17)  SpO2: 98% (98% - 100%)  Wt(kg): --  I&O's Summary    I & Os for current day (as of 30 May 2017 08:43)  =============================================  IN: 540 ml / OUT: 0 ml / NET: 540 ml      Appearance: Normal	  Cardiovascular: irreg S1 S2,RRR, No JVD, No murmurs  Respiratory: Lungs clear to auscultation	  Gastrointestinal:  Soft, Non-tender, + BS	  Extremities: Normal range of motion, No clubbing, cyanosis or edema  Vascular: Peripheral pulses palpable 2+ bilaterally     LABS:	 	                          13.8   8.99  )-----------( 234      ( 30 May 2017 06:30 )             43.2     05-30    140  |  95<L>  |  38<H>  ----------------------------<  104<H>  3.3<L>   |  33<H>  |  1.29    Ca    10.3      30 May 2017 06:30  Mg     2.1     05-30        PT/INR - ( 30 May 2017 06:30 )   PT: 26.8 SEC;   INR: 2.35              CARDIAC MARKERS:

## 2017-05-30 NOTE — DISCHARGE NOTE ADULT - PROVIDER TOKENS
FREE:[LAST:[Call your PMD and gastroenterologist within 2 weeks],PHONE:[(   )    -],FAX:[(   )    -]]

## 2017-05-30 NOTE — PROGRESS NOTE ADULT - ASSESSMENT
·  Problem: ACS (acute coronary syndrome).  Plan: Admit to telemetry.   Ischemia evaluation as per cards    Problem/Plan - 2:  ·  Problem: Hypokalemia.  Plan: Continue to monitor potassium levels.   Potassium chloride 40 meq was given. Replete as required    Problem/Plan - 3:  ·  Problem: CHF (congestive heart failure).  Plan: Continue with lasix 40 mg QD.  Strict I and O, daily weights.     Problem/Plan - 4:  ·  Problem: Afib.  Plan: [id number] score of 3. Pt is currently rate controlled. INR is therapeutic and warfarin is dosed.     Problem/Plan - 5:  ·  Problem: CAD (coronary artery disease).  Plan: Patient is not on ASA. Continue with DASH diet.     Problem/Plan - 6:  Problem: Need for prophylactic measure. Plan: Pt is anticoagulated with warfarin.    dc planning. EGD as outpt

## 2017-05-30 NOTE — DISCHARGE NOTE ADULT - MEDICATION SUMMARY - MEDICATIONS TO TAKE
I will START or STAY ON the medications listed below when I get home from the hospital:    aspirin 81 mg oral delayed release tablet  -- 1 tab(s) by mouth once a day  -- Indication: For Heart    warfarin 5 mg oral tablet  -- 1 tab(s) by mouth once a day  -- Indication: For Atrial fib    Lasix 40 mg oral tablet  -- 1 tab(s) by mouth once a day  -- Indication: For Heart I will START or STAY ON the medications listed below when I get home from the hospital:    aspirin 81 mg oral delayed release tablet  -- 1 tab(s) by mouth once a day  -- Indication: For CAD (coronary artery disease)    aluminum hydroxide-magnesium hydroxide 200 mg-200 mg/5 mL oral suspension  -- 30 milliliter(s) by mouth every 4 hours, As needed, Dyspepsia  -- Indication: For Gerd    warfarin 5 mg oral tablet  -- 1 tab(s) by mouth once a day  -- Indication: For Atrial fib    QUEtiapine 25 mg oral tablet  -- 1 tab(s) by mouth once a day (at bedtime)  -- Indication: For Antipsy    Lasix 40 mg oral tablet  -- 1 tab(s) by mouth once a day  -- Indication: For Heart    bisacodyl 5 mg oral delayed release tablet  -- 2 tab(s) by mouth once a day (at bedtime)  -- Indication: For laxative    Klor-Con M20 oral tablet, extended release  -- 1 tab(s) by mouth once a day  -- Indication: For potassium supplement    pantoprazole 40 mg oral delayed release tablet  -- 1 tab(s) by mouth once a day (before a meal)  -- Indication: For gerd

## 2017-05-31 LAB
BUN SERPL-MCNC: 37 MG/DL — HIGH (ref 7–23)
CALCIUM SERPL-MCNC: 10 MG/DL — SIGNIFICANT CHANGE UP (ref 8.4–10.5)
CHLORIDE SERPL-SCNC: 95 MMOL/L — LOW (ref 98–107)
CO2 SERPL-SCNC: 36 MMOL/L — HIGH (ref 22–31)
CREAT SERPL-MCNC: 1.13 MG/DL — SIGNIFICANT CHANGE UP (ref 0.5–1.3)
GLUCOSE SERPL-MCNC: 96 MG/DL — SIGNIFICANT CHANGE UP (ref 70–99)
HCT VFR BLD CALC: 44 % — SIGNIFICANT CHANGE UP (ref 34.5–45)
HGB BLD-MCNC: 14.2 G/DL — SIGNIFICANT CHANGE UP (ref 11.5–15.5)
INR BLD: 2.15 — HIGH (ref 0.88–1.17)
MAGNESIUM SERPL-MCNC: 2.1 MG/DL — SIGNIFICANT CHANGE UP (ref 1.6–2.6)
MCHC RBC-ENTMCNC: 29.3 PG — SIGNIFICANT CHANGE UP (ref 27–34)
MCHC RBC-ENTMCNC: 32.3 % — SIGNIFICANT CHANGE UP (ref 32–36)
MCV RBC AUTO: 90.7 FL — SIGNIFICANT CHANGE UP (ref 80–100)
PLATELET # BLD AUTO: 238 K/UL — SIGNIFICANT CHANGE UP (ref 150–400)
PMV BLD: 11.6 FL — SIGNIFICANT CHANGE UP (ref 7–13)
POTASSIUM SERPL-MCNC: 3.3 MMOL/L — LOW (ref 3.5–5.3)
POTASSIUM SERPL-SCNC: 3.3 MMOL/L — LOW (ref 3.5–5.3)
PROTHROM AB SERPL-ACNC: 24.5 SEC — HIGH (ref 9.8–13.1)
RBC # BLD: 4.85 M/UL — SIGNIFICANT CHANGE UP (ref 3.8–5.2)
RBC # FLD: 13.7 % — SIGNIFICANT CHANGE UP (ref 10.3–14.5)
SODIUM SERPL-SCNC: 142 MMOL/L — SIGNIFICANT CHANGE UP (ref 135–145)
WBC # BLD: 9.24 K/UL — SIGNIFICANT CHANGE UP (ref 3.8–10.5)
WBC # FLD AUTO: 9.24 K/UL — SIGNIFICANT CHANGE UP (ref 3.8–10.5)

## 2017-05-31 PROCEDURE — 90792 PSYCH DIAG EVAL W/MED SRVCS: CPT

## 2017-05-31 RX ORDER — WARFARIN SODIUM 2.5 MG/1
5 TABLET ORAL ONCE
Qty: 0 | Refills: 0 | Status: COMPLETED | OUTPATIENT
Start: 2017-05-31 | End: 2017-05-31

## 2017-05-31 RX ORDER — QUETIAPINE FUMARATE 200 MG/1
25 TABLET, FILM COATED ORAL AT BEDTIME
Qty: 0 | Refills: 0 | Status: DISCONTINUED | OUTPATIENT
Start: 2017-05-31 | End: 2017-06-02

## 2017-05-31 RX ORDER — SOD SULF/SODIUM/NAHCO3/KCL/PEG
4000 SOLUTION, RECONSTITUTED, ORAL ORAL ONCE
Qty: 0 | Refills: 0 | Status: COMPLETED | OUTPATIENT
Start: 2017-05-31 | End: 2017-05-31

## 2017-05-31 RX ORDER — POTASSIUM CHLORIDE 20 MEQ
40 PACKET (EA) ORAL EVERY 4 HOURS
Qty: 0 | Refills: 0 | Status: COMPLETED | OUTPATIENT
Start: 2017-05-31 | End: 2017-05-31

## 2017-05-31 RX ORDER — POTASSIUM CHLORIDE 20 MEQ
40 PACKET (EA) ORAL ONCE
Qty: 0 | Refills: 0 | Status: COMPLETED | OUTPATIENT
Start: 2017-05-31 | End: 2017-05-31

## 2017-05-31 RX ADMIN — Medication 30 MILLILITER(S): at 06:04

## 2017-05-31 RX ADMIN — WARFARIN SODIUM 5 MILLIGRAM(S): 2.5 TABLET ORAL at 17:28

## 2017-05-31 RX ADMIN — Medication 40 MILLIEQUIVALENT(S): at 17:28

## 2017-05-31 RX ADMIN — PANTOPRAZOLE SODIUM 40 MILLIGRAM(S): 20 TABLET, DELAYED RELEASE ORAL at 17:29

## 2017-05-31 RX ADMIN — Medication 40 MILLIGRAM(S): at 05:39

## 2017-05-31 RX ADMIN — Medication 40 MILLIEQUIVALENT(S): at 08:50

## 2017-05-31 RX ADMIN — Medication 4000 MILLILITER(S): at 16:00

## 2017-05-31 RX ADMIN — Medication 10 MILLIGRAM(S): at 22:39

## 2017-05-31 RX ADMIN — PANTOPRAZOLE SODIUM 40 MILLIGRAM(S): 20 TABLET, DELAYED RELEASE ORAL at 05:39

## 2017-05-31 RX ADMIN — QUETIAPINE FUMARATE 25 MILLIGRAM(S): 200 TABLET, FILM COATED ORAL at 22:39

## 2017-05-31 RX ADMIN — Medication 40 MILLIEQUIVALENT(S): at 12:00

## 2017-05-31 RX ADMIN — Medication 81 MILLIGRAM(S): at 12:00

## 2017-05-31 NOTE — BEHAVIORAL HEALTH ASSESSMENT NOTE - OTHER PAST PSYCHIATRIC HISTORY (INCLUDE DETAILS REGARDING ONSET, COURSE OF ILLNESS, INPATIENT/OUTPATIENT TREATMENT)
pt denies having any prior hospitalizations for psychiatric problems, but says she did hear voices in the past.

## 2017-05-31 NOTE — BEHAVIORAL HEALTH ASSESSMENT NOTE - NSBHCONSULTFOLLOWDETAILS_PSY_A_CORE FT
Pt will need to be evaluated to see if she is interested in going to [facility]. She did already sign voluntary admission forms.

## 2017-05-31 NOTE — PROGRESS NOTE ADULT - ASSESSMENT
Problem: ACS (acute coronary syndrome).  Plan: Admit to telemetry.   Ischemia evaluation as per cards    Problem/Plan - 2:  ·  Problem: Hypokalemia.  Plan: Continue to monitor potassium levels.   Potassium chloride 40 meq was given. Replete as required    Problem/Plan - 3:  ·  Problem: CHF (congestive heart failure).  Plan: Continue with lasix 40 mg QD.  Strict I and O, daily weights.     Problem/Plan - 4:  ·  Problem: Afib.  Plan: [id number] score of 3. Pt is currently rate controlled. INR is therapeutic and warfarin is dosed.     Problem/Plan - 5:  ·  Problem: CAD (coronary artery disease).  Plan: Patient is not on ASA. Continue with DASH diet.     Problem/Plan - 6:  Problem: Need for prophylactic measure. Plan: Pt is anticoagulated with warfarin.    karine planning to [facility]

## 2017-05-31 NOTE — PROGRESS NOTE ADULT - ASSESSMENT
7 y/o F with h/o CAD s/p CABG, CHF, a.fib on coumadin, asthma admitted with chest pain    -CV stable  - Atypical Chest pain:  -stress negative for ischemia  -no further cardiac workup at this time  - c.o epigastric chest pain : GI f.u - plan for colonscopy   -cont lasix  -Afib : rate controlled , coumadin per INR(CHADSVASC 3)  -continue with daily asa 57 y/o F with h/o CAD s/p CABG, CHF, a.fib on coumadin, asthma admitted with chest pain    -CV stable  - Atypical Chest pain:  -stress negative for ischemia  -no further cardiac workup at this time  - c.o epigastric chest pain : GI f.u - plan for colonscopy   -cont lasix  -Afib : rate controlled , coumadin per INR(CHADSVASC 3)  -continue with daily asa

## 2017-05-31 NOTE — PROGRESS NOTE ADULT - SUBJECTIVE AND OBJECTIVE BOX
PHYSICAL EXAM:  T(C): 36.7, Max: 36.9 (05-30 @ 16:50)  HR: 85 (75 - 98)  BP: 115/57 (107/75 - 115/80)  RR: 16 (16 - 16)  SpO2: 99% (98% - 99%)  Wt(kg): --  I&O's Summary    I & Os for current day (as of 31 May 2017 09:38)  =============================================  IN: 360 ml / OUT: 0 ml / NET: 360 ml      Appearance: Normal	  Cardiovascular: Normal S1 S2,RRR, No JVD, No murmurs  Respiratory: Lungs clear to auscultation	  Gastrointestinal:  Soft, Non-tender, + BS	  Extremities: Normal range of motion, No clubbing, cyanosis or edema        MEDICATIONS  (STANDING):  furosemide    Tablet 40milliGRAM(s) Oral daily  aspirin enteric coated 81milliGRAM(s) Oral daily  pantoprazole  Injectable 40milliGRAM(s) IV Push every 12 hours  potassium chloride    Tablet ER 40milliEquivalent(s) Oral every 4 hours  warfarin 5milliGRAM(s) Oral once  bisacodyl 10milliGRAM(s) Oral at bedtime  polyethylene glycol/electrolyte Solution. 4000milliLiter(s) Oral once      Tele : off tele   Echo:   Ejection Fraction (Teicholtz): 55 %  ------------------------------------------------------------------------  CONCLUSIONS:  1. Normal mitral valve. Mild mitral regurgitation.  2. Normal left ventricular internal dimensions and wall  thicknesses.  3. Normal left ventricular systolic function. No segmental  wall motion abnormalities.  4. Normal right ventricular size and function.  5. Estimated pulmonary artery systolic pressure equals 29  mm Hg, assuming right atrial pressure equals 10  mm Hg,  consistent with normal pulmonary pressures.  ------------------------------------------------------------------------    stress test :   ------------------------------------------------------------------------  IMPRESSIONS:Normal Study  *Myocardial Perfusion SPECT results are normal.  * Review of raw data shows: The study is of good technical  quality.  * The left ventricle was normal in size. Normal myocardial  perfusion scan,with no evidence of infarction or inducible  ischemia.  *Post-stress gated wall motion analysis was performed  (LVEF = 48 %;LVEDV = 79 ml.), revealing low normal LV  function.  ------------------------------------------------------------------------    LABS:	 	                              14.2   9.24  )-----------( 238      ( 31 May 2017 06:00 )             44.0     05-31    142  |  95<L>  |  37<H>  ----------------------------<  96  3.3<L>   |  36<H>  |  1.13    Ca    10.0      31 May 2017 06:00  Mg     2.1     05-31      PT/INR - ( 31 May 2017 06:00 )   PT: 24.5 SEC;   INR: 2.15 CC : c/o epigastric pain, no sob or palpitations       PHYSICAL EXAM:  T(C): 36.7, Max: 36.9 (05-30 @ 16:50)  HR: 85 (75 - 98)  BP: 115/57 (107/75 - 115/80)  RR: 16 (16 - 16)  SpO2: 99% (98% - 99%)  Wt(kg): --  I&O's Summary    I & Os for current day (as of 31 May 2017 09:38)  =============================================  IN: 360 ml / OUT: 0 ml / NET: 360 ml      Appearance: Normal	  Cardiovascular: Normal S1 S2,Ireggular , No JVD, No murmurs  Respiratory: Lungs clear to auscultation	  Gastrointestinal:  Soft, Non-tender, + BS	  Extremities: Normal range of motion, No clubbing, cyanosis or edema        MEDICATIONS  (STANDING):  furosemide    Tablet 40milliGRAM(s) Oral daily  aspirin enteric coated 81milliGRAM(s) Oral daily  pantoprazole  Injectable 40milliGRAM(s) IV Push every 12 hours  potassium chloride    Tablet ER 40milliEquivalent(s) Oral every 4 hours  warfarin 5milliGRAM(s) Oral once  bisacodyl 10milliGRAM(s) Oral at bedtime  polyethylene glycol/electrolyte Solution. 4000milliLiter(s) Oral once      Tele : off tele   Echo:   Ejection Fraction (Teicholtz): 55 %  ------------------------------------------------------------------------  CONCLUSIONS:  1. Normal mitral valve. Mild mitral regurgitation.  2. Normal left ventricular internal dimensions and wall  thicknesses.  3. Normal left ventricular systolic function. No segmental  wall motion abnormalities.  4. Normal right ventricular size and function.  5. Estimated pulmonary artery systolic pressure equals 29  mm Hg, assuming right atrial pressure equals 10  mm Hg,  consistent with normal pulmonary pressures.  ------------------------------------------------------------------------    stress test :   ------------------------------------------------------------------------  IMPRESSIONS:Normal Study  *Myocardial Perfusion SPECT results are normal.  * Review of raw data shows: The study is of good technical  quality.  * The left ventricle was normal in size. Normal myocardial  perfusion scan,with no evidence of infarction or inducible  ischemia.  *Post-stress gated wall motion analysis was performed  (LVEF = 48 %;LVEDV = 79 ml.), revealing low normal LV  function.  ------------------------------------------------------------------------    LABS:	 	                              14.2   9.24  )-----------( 238      ( 31 May 2017 06:00 )             44.0     05-31    142  |  95<L>  |  37<H>  ----------------------------<  96  3.3<L>   |  36<H>  |  1.13    Ca    10.0      31 May 2017 06:00  Mg     2.1     05-31      PT/INR - ( 31 May 2017 06:00 )   PT: 24.5 SEC;   INR: 2.15
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
## 2017-05-31 NOTE — BEHAVIORAL HEALTH ASSESSMENT NOTE - SUMMARY
Patient is a 55 y/o  woman with h/o CAD, CABG, CHF, A. fib on coumadin, asthma presented to the ED for chest pain. She stated she has been having intermittent chest pain for the past month. PT is anxious and depressed about her housing problems and her inability to work. She feels that people are   against her and agrees to restart Seroquel. She signed voluntary admission forms.
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
## 2017-05-31 NOTE — PROGRESS NOTE ADULT - SUBJECTIVE AND OBJECTIVE BOX
Patient is a 56y old  Female who presents with a chief complaint of chest pain (30 May 2017 14:44)      INTERVAL HPI/OVERNIGHT EVENTS:  T(C): 36.7, Max: 36.9 (05-30 @ 16:50)  HR: 85 (75 - 98)  BP: 115/57 (107/75 - 115/80)  RR: 16 (16 - 16)  SpO2: 99% (98% - 99%)  Wt(kg): --  I&O's Summary    I & Os for current day (as of 31 May 2017 13:31)  =============================================  IN: 360 ml / OUT: 0 ml / NET: 360 ml      LABS:                        14.2   9.24  )-----------( 238      ( 31 May 2017 06:00 )             44.0     05-31    142  |  95<L>  |  37<H>  ----------------------------<  96  3.3<L>   |  36<H>  |  1.13    Ca    10.0      31 May 2017 06:00  Mg     2.1     05-31      PT/INR - ( 31 May 2017 06:00 )   PT: 24.5 SEC;   INR: 2.15              CAPILLARY BLOOD GLUCOSE            MEDICATIONS  (STANDING):  furosemide    Tablet 40milliGRAM(s) Oral daily  aspirin enteric coated 81milliGRAM(s) Oral daily  pantoprazole  Injectable 40milliGRAM(s) IV Push every 12 hours  warfarin 5milliGRAM(s) Oral once  bisacodyl 10milliGRAM(s) Oral at bedtime  polyethylene glycol/electrolyte Solution. 4000milliLiter(s) Oral once    MEDICATIONS  (PRN):  aluminum hydroxide/magnesium hydroxide/simethicone Suspension 30milliLiter(s) Oral every 4 hours PRN Dyspepsia      REVIEW OF SYSTEMS:  CONSTITUTIONAL: No fever, weight loss, or fatigue  EYES: No eye pain, visual disturbances, or discharge  ENMT:  No difficulty hearing, tinnitus, vertigo; No sinus or throat pain  NECK: No pain or stiffness  RESPIRATORY: No cough, wheezing, chills or hemoptysis; No shortness of breath  CARDIOVASCULAR: No chest pain, palpitations, dizziness, or leg swelling  GASTROINTESTINAL: No abdominal or epigastric pain. No nausea, vomiting, or hematemesis; No diarrhea or constipation. No melena or hematochezia.  GENITOURINARY: No dysuria, frequency, hematuria, or incontinence  NEUROLOGICAL: No headaches, memory loss, loss of strength, numbness, or tremors  SKIN: No itching, burning, rashes, or lesions   LYMPH NODES: No enlarged glands  ENDOCRINE: No heat or cold intolerance; No hair loss  MUSCULOSKELETAL: No joint pain or swelling; No muscle, back, or extremity pain  PSYCHIATRIC: No depression, anxiety, mood swings, or difficulty sleeping  HEME/LYMPH: No easy bruising, or bleeding gums  ALLERY AND IMMUNOLOGIC: No hives or eczema    RADIOLOGY & ADDITIONAL TESTS:    Imaging Personally Reviewed:  [ ] YES  [ ] NO    Consultant(s) Notes Reviewed:  [ ] YES  [ ] NO    PHYSICAL EXAM:  GENERAL: NAD, well-groomed, well-developed  HEAD:  Atraumatic, Normocephalic  EYES: EOMI, PERRLA, conjunctiva and sclera clear  ENMT: No tonsillar erythema, exudates, or enlargement; Moist mucous membranes, Good dentition, No lesions  NECK: Supple, No JVD, Normal thyroid  NERVOUS SYSTEM:  Alert & Oriented X3, Good concentration; Motor Strength 5/5 B/L upper and lower extremities; DTRs 2+ intact and symmetric  CHEST/LUNG: Clear to percussion bilaterally; No rales, rhonchi, wheezing, or rubs  HEART: Regular rate and rhythm; No murmurs, rubs, or gallops  ABDOMEN: Soft, Nontender, Nondistended; Bowel sounds present  EXTREMITIES:  2+ Peripheral Pulses, No clubbing, cyanosis, or edema  LYMPH: No lymphadenopathy noted  SKIN: No rashes or lesions    Care Discussed with Consultants/Other Providers [+ ] YES  [ ] NO

## 2017-06-01 PROBLEM — I48.91 UNSPECIFIED ATRIAL FIBRILLATION: Chronic | Status: ACTIVE | Noted: 2017-05-27

## 2017-06-01 PROBLEM — I25.10 ATHEROSCLEROTIC HEART DISEASE OF NATIVE CORONARY ARTERY WITHOUT ANGINA PECTORIS: Chronic | Status: ACTIVE | Noted: 2017-05-27

## 2017-06-01 PROBLEM — I50.9 HEART FAILURE, UNSPECIFIED: Chronic | Status: ACTIVE | Noted: 2017-05-27

## 2017-06-01 LAB
BUN SERPL-MCNC: 25 MG/DL — HIGH (ref 7–23)
CALCIUM SERPL-MCNC: 9.3 MG/DL — SIGNIFICANT CHANGE UP (ref 8.4–10.5)
CHLORIDE SERPL-SCNC: 97 MMOL/L — LOW (ref 98–107)
CO2 SERPL-SCNC: 33 MMOL/L — HIGH (ref 22–31)
CREAT SERPL-MCNC: 1.06 MG/DL — SIGNIFICANT CHANGE UP (ref 0.5–1.3)
GLUCOSE SERPL-MCNC: 88 MG/DL — SIGNIFICANT CHANGE UP (ref 70–99)
HCG SERPL-ACNC: < 5 MIU/ML — SIGNIFICANT CHANGE UP
INR BLD: 2.05 — HIGH (ref 0.88–1.17)
MAGNESIUM SERPL-MCNC: 2.1 MG/DL — SIGNIFICANT CHANGE UP (ref 1.6–2.6)
PHOSPHATE SERPL-MCNC: 2.9 MG/DL — SIGNIFICANT CHANGE UP (ref 2.5–4.5)
POTASSIUM SERPL-MCNC: 3.5 MMOL/L — SIGNIFICANT CHANGE UP (ref 3.5–5.3)
POTASSIUM SERPL-SCNC: 3.5 MMOL/L — SIGNIFICANT CHANGE UP (ref 3.5–5.3)
PROTHROM AB SERPL-ACNC: 23.3 SEC — HIGH (ref 9.8–13.1)
SODIUM SERPL-SCNC: 141 MMOL/L — SIGNIFICANT CHANGE UP (ref 135–145)

## 2017-06-01 PROCEDURE — 99232 SBSQ HOSP IP/OBS MODERATE 35: CPT

## 2017-06-01 PROCEDURE — 88312 SPECIAL STAINS GROUP 1: CPT | Mod: 26

## 2017-06-01 PROCEDURE — 88305 TISSUE EXAM BY PATHOLOGIST: CPT | Mod: 26

## 2017-06-01 RX ORDER — WARFARIN SODIUM 2.5 MG/1
5 TABLET ORAL ONCE
Qty: 0 | Refills: 0 | Status: COMPLETED | OUTPATIENT
Start: 2017-06-01 | End: 2017-06-01

## 2017-06-01 RX ORDER — PANTOPRAZOLE SODIUM 20 MG/1
40 TABLET, DELAYED RELEASE ORAL
Qty: 0 | Refills: 0 | Status: DISCONTINUED | OUTPATIENT
Start: 2017-06-01 | End: 2017-06-02

## 2017-06-01 RX ADMIN — PANTOPRAZOLE SODIUM 40 MILLIGRAM(S): 20 TABLET, DELAYED RELEASE ORAL at 17:42

## 2017-06-01 RX ADMIN — Medication 40 MILLIGRAM(S): at 06:14

## 2017-06-01 RX ADMIN — PANTOPRAZOLE SODIUM 40 MILLIGRAM(S): 20 TABLET, DELAYED RELEASE ORAL at 06:14

## 2017-06-01 RX ADMIN — Medication 30 MILLILITER(S): at 21:51

## 2017-06-01 RX ADMIN — QUETIAPINE FUMARATE 25 MILLIGRAM(S): 200 TABLET, FILM COATED ORAL at 21:51

## 2017-06-01 RX ADMIN — Medication 81 MILLIGRAM(S): at 12:57

## 2017-06-01 RX ADMIN — WARFARIN SODIUM 5 MILLIGRAM(S): 2.5 TABLET ORAL at 17:43

## 2017-06-01 NOTE — PROGRESS NOTE ADULT - ASSESSMENT
57 y/o F with h/o CAD s/p CABG, CHF, a.fib on coumadin, asthma admitted with chest pain    -CV stable  - c.o epigastric chest pain : GI f.u - plan for colonscopy   -cont lasix  -Afib : rate controlled , coumadin per INR (CHADSVASC 3)  -continue with daily asa

## 2017-06-01 NOTE — PROGRESS NOTE ADULT - ATTENDING COMMENTS
Patient seen and examined.  Agree with above NP note.    pt cardiac stable  no chest pain  stress neg for ischemia  asa  a/c for af  egd per gi
Patient seen and examined, agree with the above assessment and plan by MARIA DEL ROSARIO Olvera.  CV status stable  Ischemic eval negative  GI followup for possible inpt vs outpt endoscopy  coumadin per INR  ASA

## 2017-06-01 NOTE — PROGRESS NOTE ADULT - SUBJECTIVE AND OBJECTIVE BOX
Patient is a 56y old  Female who presents with a chief complaint of chest pain (30 May 2017 14:44)      INTERVAL HPI/OVERNIGHT EVENTS:  T(C): 36.7, Max: 36.8 (05-31 @ 14:57)  HR: 68 (63 - 73)  BP: 116/80 (97/64 - 118/80)  RR: 18 (17 - 18)  SpO2: 100% (100% - 100%)  Wt(kg): --  I&O's Summary      LABS:                        14.2   9.24  )-----------( 238      ( 31 May 2017 06:00 )             44.0     06-01    141  |  97<L>  |  25<H>  ----------------------------<  88  3.5   |  33<H>  |  1.06    Ca    9.3      01 Jun 2017 06:05  Phos  2.9     06-01  Mg     2.1     06-01      PT/INR - ( 01 Jun 2017 06:05 )   PT: 23.3 SEC;   INR: 2.05              CAPILLARY BLOOD GLUCOSE            MEDICATIONS  (STANDING):  furosemide    Tablet 40milliGRAM(s) Oral daily  aspirin enteric coated 81milliGRAM(s) Oral daily  pantoprazole  Injectable 40milliGRAM(s) IV Push every 12 hours  bisacodyl 10milliGRAM(s) Oral at bedtime  QUEtiapine 25milliGRAM(s) Oral at bedtime    MEDICATIONS  (PRN):  aluminum hydroxide/magnesium hydroxide/simethicone Suspension 30milliLiter(s) Oral every 4 hours PRN Dyspepsia      REVIEW OF SYSTEMS:  CONSTITUTIONAL: No fever, weight loss, or fatigue  EYES: No eye pain, visual disturbances, or discharge  ENMT:  No difficulty hearing, tinnitus, vertigo; No sinus or throat pain  NECK: No pain or stiffness  RESPIRATORY: No cough, wheezing, chills or hemoptysis; No shortness of breath  CARDIOVASCULAR: No chest pain, palpitations, dizziness, or leg swelling  GASTROINTESTINAL: No abdominal or epigastric pain. No nausea, vomiting, or hematemesis; No diarrhea or constipation. No melena or hematochezia.  GENITOURINARY: No dysuria, frequency, hematuria, or incontinence  NEUROLOGICAL: No headaches, memory loss, loss of strength, numbness, or tremors  SKIN: No itching, burning, rashes, or lesions   LYMPH NODES: No enlarged glands  ENDOCRINE: No heat or cold intolerance; No hair loss  MUSCULOSKELETAL: No joint pain or swelling; No muscle, back, or extremity pain  PSYCHIATRIC: No depression, anxiety, mood swings, or difficulty sleeping  HEME/LYMPH: No easy bruising, or bleeding gums  ALLERY AND IMMUNOLOGIC: No hives or eczema    RADIOLOGY & ADDITIONAL TESTS:    Imaging Personally Reviewed:  [ ] YES  [ ] NO    Consultant(s) Notes Reviewed:  [ ] YES  [ ] NO    PHYSICAL EXAM:  GENERAL: NAD, well-groomed, well-developed  HEAD:  Atraumatic, Normocephalic  EYES: EOMI, PERRLA, conjunctiva and sclera clear  ENMT: No tonsillar erythema, exudates, or enlargement; Moist mucous membranes, Good dentition, No lesions  NECK: Supple, No JVD, Normal thyroid  NERVOUS SYSTEM:  Alert & Oriented X3, Good concentration; Motor Strength 5/5 B/L upper and lower extremities; DTRs 2+ intact and symmetric  CHEST/LUNG: Clear to percussion bilaterally; No rales, rhonchi, wheezing, or rubs  HEART: Regular rate and rhythm; No murmurs, rubs, or gallops  ABDOMEN: Soft, Nontender, Nondistended; Bowel sounds present  EXTREMITIES:  2+ Peripheral Pulses, No clubbing, cyanosis, or edema  LYMPH: No lymphadenopathy noted  SKIN: No rashes or lesions    Care Discussed with Consultants/Other Providers [* ] YES  [ ] NO

## 2017-06-01 NOTE — PROGRESS NOTE ADULT - SUBJECTIVE AND OBJECTIVE BOX
PHYSICAL EXAM:  T(C): 36.7, Max: 36.8 (05-31 @ 14:57)  HR: 68 (63 - 73)  BP: 116/80 (97/64 - 118/80)  RR: 18 (17 - 18)  SpO2: 100% (100% - 100%)  Wt(kg): --  I&O's Summary      Appearance: Normal	  Cardiovascular: Normal S1 S2,RRR, No JVD, No murmurs  Respiratory: Lungs clear to auscultation	  Gastrointestinal:  Soft, Non-tender, + BS	  Extremities: Normal range of motion, No clubbing, cyanosis or edema        MEDICATIONS  (STANDING):  furosemide    Tablet 40milliGRAM(s) Oral daily  aspirin enteric coated 81milliGRAM(s) Oral daily  pantoprazole  Injectable 40milliGRAM(s) IV Push every 12 hours  bisacodyl 10milliGRAM(s) Oral at bedtime  QUEtiapine 25milliGRAM(s) Oral at bedtime      tele : off tele     Echo: Study Date: 5/28/2017  Ejection Fraction (Teicholtz): 55 %  ------------------------------------------------------------------------  CONCLUSIONS:  1. Normal mitral valve. Mild mitral regurgitation.  2. Normal left ventricular internal dimensions and wall  thicknesses.  3. Normal left ventricular systolic function. No segmental  wall motion abnormalities.  4. Normal right ventricular size and function.  5. Estimated pulmonary artery systolic pressure equals 29  mm Hg, assuming right atrial pressure equals 10  mm Hg,  consistent with normal pulmonary pressures.  ------------------------------------------------------------------------    stress test : STUDY DATE: 05/28/2017  ------------------------------------------------------------------------  IMPRESSIONS:Normal Study  *Myocardial Perfusion SPECT results are normal.  * Review of raw data shows: The study is of good technical  quality.  * The left ventricle was normal in size. Normal myocardial  perfusion scan,with no evidence of infarction or inducible  ischemia.  *Post-stress gated wall motion analysis was performed  (LVEF = 48 %;LVEDV = 79 ml.), revealing low normal LV  function.  ------------------------------------------------------------------------	    LABS:	 	                            14.2   9.24  )-----------( 238      ( 31 May 2017 06:00 )             44.0     06-01    141  |  97<L>  |  25<H>  ----------------------------<  88  3.5   |  33<H>  |  1.06    Ca    9.3      01 Jun 2017 06:05  Phos  2.9     06-01  Mg     2.1     06-01      PT/INR - ( 01 Jun 2017 06:05 )   PT: 23.3 SEC;   INR: 2.05

## 2017-06-01 NOTE — PROGRESS NOTE ADULT - ASSESSMENT
Problem: ACS (acute coronary syndrome).  Plan: Admit to telemetry.   Ischemia evaluation as per cards    Problem/Plan - 2:  ·  Problem: Hypokalemia.  Plan: Continue to monitor potassium levels.   Potassium chloride 40 meq was given. Replete as required    Problem/Plan - 3:  ·  Problem: CHF (congestive heart failure).  Plan: Continue with lasix 40 mg QD.  Strict I and O, daily weights.     Problem/Plan - 4:  ·  Problem: Afib.  Plan: [id number] score of 3. Pt is currently rate controlled. INR is therapeutic and warfarin is dosed.     Problem/Plan - 5:  ·  Problem: CAD (coronary artery disease).  Plan: Patient is not on ASA. Continue with DASH diet.     Problem/Plan - 6:  Problem: Need for prophylactic measure. Plan: Pt is anticoagulated with warfarin.    karine planning to [facility]

## 2017-06-01 NOTE — PROGRESS NOTE ADULT - SUBJECTIVE AND OBJECTIVE BOX
Patient is a 56y Female     Patient is a 56y old  Female who presents with a chief complaint of chest pain (30 May 2017 14:44)      HPI:  55 y/o F with h/o CAD, CABG, CHF, A. fib on coumadin, asthma presents to the ED for chest pain. Pt is a poor historian. Pt states she has been having intermittent chest pain for the past month. Pt report sthe chest pain is on the left side and is exertional and better with rest. Pt states it is associated with shortness of breath. Pt also states she has been burping alot and it relieves her chest pain. Pt denies LOC, syncope, fever, chills, palpitations, numbness, peripheral edema, tingling, dysuria, urinary/bowel incontinence or any other complaints at this time. (27 May 2017 05:08)      PAST MEDICAL & SURGICAL HISTORY:  Asthma  CAD (coronary artery disease)  Afib  CHF (congestive heart failure)  S/P thyroid surgery  S/P hip replacement, left  S/P CABG (coronary artery bypass graft)      MEDICATIONS  (STANDING):  furosemide    Tablet 40milliGRAM(s) Oral daily  aspirin enteric coated 81milliGRAM(s) Oral daily  pantoprazole  Injectable 40milliGRAM(s) IV Push every 12 hours  bisacodyl 10milliGRAM(s) Oral at bedtime  QUEtiapine 25milliGRAM(s) Oral at bedtime      Allergies    No Known Allergies    Intolerances        SOCIAL HISTORY:  Denies ETOh,Smoking,     FAMILY HISTORY:  No pertinent family history in first degree relatives      REVIEW OF SYSTEMS:    CONSTITUTIONAL: No weakness, fevers or chills  EYES/ENT: No visual changes;  No vertigo or throat pain   NECK: No pain or stiffness  RESPIRATORY: No cough, wheezing, hemoptysis; No shortness of breath  CARDIOVASCULAR: No chest pain or palpitations  GASTROINTESTINAL: No abdominal or epigastric pain. No nausea, vomiting, or hematemesis; No diarrhea or constipation. No melena or hematochezia.  GENITOURINARY: No dysuria, frequency or hematuria  NEUROLOGICAL: No numbness or weakness  SKIN: No itching, burning, rashes, or lesions   All other review of systems is negative unless indicated above.    VITAL:  T(C): , Max: 36.8 (05-31 @ 14:57)  T(F): , Max: 98.3 (05-31 @ 14:57)  HR: 68  BP: 116/80  BP(mean): --  RR: 18  SpO2: 100%  Wt(kg): --    I and O's:    I & Os for current day (as of 06-01 @ 07:29)  =============================================  IN: 360 ml / OUT: 0 ml / NET: 360 ml        PHYSICAL EXAM:    Constitutional: NAD  HEENT: PERRLA,   Neck: No JVD  Respiratory: CTA B/L  Cardiovascular: S1 and S2  Gastrointestinal: BS+, soft, NT/ND  Extremities: No peripheral edema  Neurological: A/O x 3, no focal deficits  Psychiatric: Normal mood, normal affect  : No Tapia  Skin: No rashes  Access: Not applicable  Back: No CVA tenderness    LABS:                        14.2   9.24  )-----------( 238      ( 31 May 2017 06:00 )             44.0     05-31    142  |  95<L>  |  37<H>  ----------------------------<  96  3.3<L>   |  36<H>  |  1.13    Ca    10.0      31 May 2017 06:00  Mg     2.1     05-31            RADIOLOGY & ADDITIONAL STUDIES:

## 2017-06-02 ENCOUNTER — INPATIENT (INPATIENT)
Facility: HOSPITAL | Age: 57
LOS: 4 days | Discharge: HOME | End: 2017-06-07
Attending: PSYCHIATRY & NEUROLOGY | Admitting: PSYCHIATRY & NEUROLOGY
Payer: MEDICARE

## 2017-06-02 VITALS — WEIGHT: 160.94 LBS | TEMPERATURE: 98 F | OXYGEN SATURATION: 100 % | HEIGHT: 66 IN

## 2017-06-02 VITALS
HEART RATE: 85 BPM | RESPIRATION RATE: 18 BRPM | TEMPERATURE: 98 F | DIASTOLIC BLOOD PRESSURE: 63 MMHG | OXYGEN SATURATION: 97 % | SYSTOLIC BLOOD PRESSURE: 110 MMHG

## 2017-06-02 DIAGNOSIS — F33.9 MAJOR DEPRESSIVE DISORDER, RECURRENT, UNSPECIFIED: ICD-10-CM

## 2017-06-02 DIAGNOSIS — Z96.642 PRESENCE OF LEFT ARTIFICIAL HIP JOINT: Chronic | ICD-10-CM

## 2017-06-02 DIAGNOSIS — Z98.890 OTHER SPECIFIED POSTPROCEDURAL STATES: Chronic | ICD-10-CM

## 2017-06-02 DIAGNOSIS — Z95.1 PRESENCE OF AORTOCORONARY BYPASS GRAFT: Chronic | ICD-10-CM

## 2017-06-02 LAB
BUN SERPL-MCNC: 29 MG/DL — HIGH (ref 7–23)
CALCIUM SERPL-MCNC: 9.2 MG/DL — SIGNIFICANT CHANGE UP (ref 8.4–10.5)
CHLORIDE SERPL-SCNC: 97 MMOL/L — LOW (ref 98–107)
CO2 SERPL-SCNC: 31 MMOL/L — SIGNIFICANT CHANGE UP (ref 22–31)
CREAT SERPL-MCNC: 1.11 MG/DL — SIGNIFICANT CHANGE UP (ref 0.5–1.3)
GLUCOSE SERPL-MCNC: 90 MG/DL — SIGNIFICANT CHANGE UP (ref 70–99)
HCT VFR BLD CALC: 42.9 % — SIGNIFICANT CHANGE UP (ref 34.5–45)
HGB BLD-MCNC: 13.6 G/DL — SIGNIFICANT CHANGE UP (ref 11.5–15.5)
INR BLD: 2.06 — HIGH (ref 0.88–1.17)
MAGNESIUM SERPL-MCNC: 2.3 MG/DL — SIGNIFICANT CHANGE UP (ref 1.6–2.6)
MCHC RBC-ENTMCNC: 29.6 PG — SIGNIFICANT CHANGE UP (ref 27–34)
MCHC RBC-ENTMCNC: 31.7 % — LOW (ref 32–36)
MCV RBC AUTO: 93.5 FL — SIGNIFICANT CHANGE UP (ref 80–100)
PHOSPHATE SERPL-MCNC: 3.5 MG/DL — SIGNIFICANT CHANGE UP (ref 2.5–4.5)
PLATELET # BLD AUTO: 216 K/UL — SIGNIFICANT CHANGE UP (ref 150–400)
PMV BLD: 11.4 FL — SIGNIFICANT CHANGE UP (ref 7–13)
POTASSIUM SERPL-MCNC: 3.3 MMOL/L — LOW (ref 3.5–5.3)
POTASSIUM SERPL-SCNC: 3.3 MMOL/L — LOW (ref 3.5–5.3)
PROTHROM AB SERPL-ACNC: 23.4 SEC — HIGH (ref 9.8–13.1)
RBC # BLD: 4.59 M/UL — SIGNIFICANT CHANGE UP (ref 3.8–5.2)
RBC # FLD: 13.8 % — SIGNIFICANT CHANGE UP (ref 10.3–14.5)
SODIUM SERPL-SCNC: 140 MMOL/L — SIGNIFICANT CHANGE UP (ref 135–145)
WBC # BLD: 8.09 K/UL — SIGNIFICANT CHANGE UP (ref 3.8–10.5)
WBC # FLD AUTO: 8.09 K/UL — SIGNIFICANT CHANGE UP (ref 3.8–10.5)

## 2017-06-02 PROCEDURE — 99232 SBSQ HOSP IP/OBS MODERATE 35: CPT

## 2017-06-02 PROCEDURE — 93010 ELECTROCARDIOGRAM REPORT: CPT

## 2017-06-02 RX ORDER — QUETIAPINE FUMARATE 200 MG/1
25 TABLET, FILM COATED ORAL AT BEDTIME
Qty: 0 | Refills: 0 | Status: DISCONTINUED | OUTPATIENT
Start: 2017-06-02 | End: 2017-06-07

## 2017-06-02 RX ORDER — WARFARIN SODIUM 2.5 MG/1
5 TABLET ORAL DAILY
Qty: 0 | Refills: 0 | Status: DISCONTINUED | OUTPATIENT
Start: 2017-06-02 | End: 2017-06-05

## 2017-06-02 RX ORDER — ASPIRIN/CALCIUM CARB/MAGNESIUM 324 MG
81 TABLET ORAL DAILY
Qty: 0 | Refills: 0 | Status: DISCONTINUED | OUTPATIENT
Start: 2017-06-02 | End: 2017-06-02

## 2017-06-02 RX ORDER — PANTOPRAZOLE SODIUM 20 MG/1
1 TABLET, DELAYED RELEASE ORAL
Qty: 0 | Refills: 0 | COMMUNITY
Start: 2017-06-02

## 2017-06-02 RX ORDER — ALBUTEROL 90 UG/1
2 AEROSOL, METERED ORAL EVERY 4 HOURS
Qty: 0 | Refills: 0 | Status: DISCONTINUED | OUTPATIENT
Start: 2017-06-02 | End: 2017-06-07

## 2017-06-02 RX ORDER — WARFARIN SODIUM 2.5 MG/1
5 TABLET ORAL DAILY
Qty: 0 | Refills: 0 | Status: DISCONTINUED | OUTPATIENT
Start: 2017-06-02 | End: 2017-06-02

## 2017-06-02 RX ORDER — ASPIRIN/CALCIUM CARB/MAGNESIUM 324 MG
81 TABLET ORAL DAILY
Qty: 0 | Refills: 0 | Status: DISCONTINUED | OUTPATIENT
Start: 2017-06-02 | End: 2017-06-05

## 2017-06-02 RX ORDER — WARFARIN SODIUM 2.5 MG/1
5 TABLET ORAL ONCE
Qty: 0 | Refills: 0 | Status: DISCONTINUED | OUTPATIENT
Start: 2017-06-02 | End: 2017-06-02

## 2017-06-02 RX ORDER — FUROSEMIDE 40 MG
40 TABLET ORAL DAILY
Qty: 0 | Refills: 0 | Status: DISCONTINUED | OUTPATIENT
Start: 2017-06-02 | End: 2017-06-06

## 2017-06-02 RX ORDER — POTASSIUM CHLORIDE 20 MEQ
40 PACKET (EA) ORAL ONCE
Qty: 0 | Refills: 0 | Status: COMPLETED | OUTPATIENT
Start: 2017-06-02 | End: 2017-06-02

## 2017-06-02 RX ORDER — SODIUM CHLORIDE 9 MG/ML
250 INJECTION INTRAMUSCULAR; INTRAVENOUS; SUBCUTANEOUS ONCE
Qty: 0 | Refills: 0 | Status: COMPLETED | OUTPATIENT
Start: 2017-06-02 | End: 2017-06-02

## 2017-06-02 RX ORDER — ALBUTEROL 90 UG/1
2 AEROSOL, METERED ORAL EVERY 4 HOURS
Qty: 0 | Refills: 0 | Status: DISCONTINUED | OUTPATIENT
Start: 2017-06-02 | End: 2017-06-02

## 2017-06-02 RX ORDER — POTASSIUM CHLORIDE 20 MEQ
40 PACKET (EA) ORAL EVERY 4 HOURS
Qty: 0 | Refills: 0 | Status: DISCONTINUED | OUTPATIENT
Start: 2017-06-02 | End: 2017-06-02

## 2017-06-02 RX ORDER — POTASSIUM CHLORIDE 20 MEQ
1 PACKET (EA) ORAL
Qty: 0 | Refills: 0 | COMMUNITY
Start: 2017-06-02

## 2017-06-02 RX ORDER — PANTOPRAZOLE SODIUM 20 MG/1
40 TABLET, DELAYED RELEASE ORAL
Qty: 0 | Refills: 0 | Status: DISCONTINUED | OUTPATIENT
Start: 2017-06-02 | End: 2017-06-07

## 2017-06-02 RX ORDER — QUETIAPINE FUMARATE 200 MG/1
1 TABLET, FILM COATED ORAL
Qty: 0 | Refills: 0 | COMMUNITY
Start: 2017-06-02

## 2017-06-02 RX ADMIN — QUETIAPINE FUMARATE 25 MILLIGRAM(S): 200 TABLET, FILM COATED ORAL at 21:18

## 2017-06-02 RX ADMIN — Medication 40 MILLIEQUIVALENT(S): at 09:13

## 2017-06-02 RX ADMIN — SODIUM CHLORIDE 250 MILLILITER(S): 9 INJECTION INTRAMUSCULAR; INTRAVENOUS; SUBCUTANEOUS at 06:35

## 2017-06-02 RX ADMIN — Medication 40 MILLIEQUIVALENT(S): at 21:18

## 2017-06-02 RX ADMIN — WARFARIN SODIUM 5 MILLIGRAM(S): 2.5 TABLET ORAL at 21:18

## 2017-06-02 RX ADMIN — Medication 81 MILLIGRAM(S): at 14:25

## 2017-06-02 RX ADMIN — Medication 40 MILLIGRAM(S): at 10:37

## 2017-06-02 RX ADMIN — Medication 40 MILLIEQUIVALENT(S): at 14:26

## 2017-06-02 RX ADMIN — PANTOPRAZOLE SODIUM 40 MILLIGRAM(S): 20 TABLET, DELAYED RELEASE ORAL at 06:34

## 2017-06-02 NOTE — PROGRESS NOTE ADULT - SUBJECTIVE AND OBJECTIVE BOX
Patient is a 56y Female     Patient is a 56y old  Female who presents with a chief complaint of chest pain (30 May 2017 14:44)      HPI:  55 y/o F with h/o CAD, CABG, CHF, A. fib on coumadin, asthma presents to the ED for chest pain. Pt is a poor historian. Pt states she has been having intermittent chest pain for the past month. Pt report sthe chest pain is on the left side and is exertional and better with rest. Pt states it is associated with shortness of breath. Pt also states she has been burping alot and it relieves her chest pain. Pt denies LOC, syncope, fever, chills, palpitations, numbness, peripheral edema, tingling, dysuria, urinary/bowel incontinence or any other complaints at this time. (27 May 2017 05:08)      PAST MEDICAL & SURGICAL HISTORY:  Asthma  CAD (coronary artery disease)  Afib  CHF (congestive heart failure)  S/P thyroid surgery  S/P hip replacement, left  S/P CABG (coronary artery bypass graft)      MEDICATIONS  (STANDING):  furosemide    Tablet 40milliGRAM(s) Oral daily  aspirin enteric coated 81milliGRAM(s) Oral daily  bisacodyl 10milliGRAM(s) Oral at bedtime  QUEtiapine 25milliGRAM(s) Oral at bedtime  pantoprazole    Tablet 40milliGRAM(s) Oral before breakfast      Allergies    No Known Allergies    Intolerances        SOCIAL HISTORY:  Denies ETOh,Smoking,     FAMILY HISTORY:  No pertinent family history in first degree relatives      REVIEW OF SYSTEMS:    CONSTITUTIONAL: No weakness, fevers or chills  EYES/ENT: No visual changes;  No vertigo or throat pain   NECK: No pain or stiffness  RESPIRATORY: No cough, wheezing, hemoptysis; No shortness of breath  CARDIOVASCULAR: No chest pain or palpitations  GASTROINTESTINAL: No abdominal or epigastric pain. No nausea, vomiting, or hematemesis; No diarrhea or constipation. No melena or hematochezia.  GENITOURINARY: No dysuria, frequency or hematuria  NEUROLOGICAL: No numbness or weakness  SKIN: No itching, burning, rashes, or lesions   All other review of systems is negative unless indicated above.    VITAL:  T(C): , Max: 36.9 (06-02 @ 06:16)  T(F): , Max: 98.4 (06-02 @ 06:16)  HR: 63  BP: 87/47  BP(mean): --  RR: 16  SpO2: 100%  Wt(kg): --    I and O's:        PHYSICAL EXAM:    Constitutional: NAD  HEENT: PERRLA,   Neck: No JVD  Respiratory: CTA B/L  Cardiovascular: S1 and S2  Gastrointestinal: BS+, soft, NT/ND  Extremities: No peripheral edema  Neurological: A/O x 3, no focal deficits  Psychiatric: Normal mood, normal affect  : No Tapia  Skin: No rashes  Access: Not applicable  Back: No CVA tenderness    LABS:                        13.6   8.09  )-----------( 216      ( 02 Jun 2017 06:35 )             42.9     06-01    141  |  97<L>  |  25<H>  ----------------------------<  88  3.5   |  33<H>  |  1.06    Ca    9.3      01 Jun 2017 06:05  Phos  2.9     06-01  Mg     2.1     06-01            RADIOLOGY & ADDITIONAL STUDIES:

## 2017-06-02 NOTE — PROGRESS NOTE ADULT - ASSESSMENT
Problem: ACS (acute coronary syndrome).  Plan: Admit to telemetry.   Ischemia evaluation as per cards    Problem/Plan - 2:  ·  Problem: Hypokalemia.  Plan: Continue to monitor potassium levels.   Potassium chloride 40 meq was given. Replete as required    Problem/Plan - 3:  ·  Problem: CHF (congestive heart failure).  Plan: Continue with lasix 40 mg QD.  Strict I and O, daily weights.     Problem/Plan - 4:  ·  Problem: Afib.  Plan: [id number] score of 3. Pt is currently rate controlled. INR is therapeutic and warfarin is dosed.     Problem/Plan - 5:  ·  Problem: CAD (coronary artery disease).  Plan: Patient is not on ASA. Continue with DASH diet.     Problem/Plan - 6:  Problem: Need for prophylactic measure. Plan: Pt is anticoagulated with warfarin.    karine planning to [facility]

## 2017-06-02 NOTE — PROGRESS NOTE ADULT - SUBJECTIVE AND OBJECTIVE BOX
CARDIOLOGY FOLLOW UP NOTE - DR. FRITZ    Subjective:  no chest pain/sob    PHYSICAL EXAM:  T(C): 36.8, Max: 36.9 (06-02 @ 06:16)  HR: 85 (63 - 85)  BP: 110/63 (87/47 - 118/83)  RR: 18 (16 - 20)  SpO2: 97% (97% - 100%)  Wt(kg): --  I&O's Summary    I & Os for current day (as of 02 Jun 2017 16:33)  =============================================  IN: 560 ml / OUT: 0 ml / NET: 560 ml      Appearance: Normal	  Cardiovascular: Normal S1 S2,RRR, No JVD, No murmurs  Respiratory: Lungs clear to auscultation	  Gastrointestinal:  Soft, Non-tender, + BS	  Extremities: Normal range of motion, No clubbing, cyanosis or edema      MEDICATIONS  (STANDING):  furosemide    Tablet 40milliGRAM(s) Oral daily  aspirin enteric coated 81milliGRAM(s) Oral daily  bisacodyl 10milliGRAM(s) Oral at bedtime  QUEtiapine 25milliGRAM(s) Oral at bedtime  pantoprazole    Tablet 40milliGRAM(s) Oral before breakfast  warfarin 5milliGRAM(s) Oral once  potassium chloride    Tablet ER 40milliEquivalent(s) Oral every 4 hours      TELEMETRY: 	    ECG:  	  RADIOLOGY:   DIAGNOSTIC TESTING:  [ ] Echocardiogram:  [ ] Catheterization:  [ ] Stress Test:    OTHER: 	    LABS:	 	    CARDIAC MARKERS:                                13.6   8.09  )-----------( 216      ( 02 Jun 2017 06:35 )             42.9     06-02    140  |  97<L>  |  29<H>  ----------------------------<  90  3.3<L>   |  31  |  1.11    Ca    9.2      02 Jun 2017 06:35  Phos  3.5     06-02  Mg     2.3     06-02      proBNP:   PT/INR - ( 02 Jun 2017 06:35 )   PT: 23.4 SEC;   INR: 2.06            Lipid Profile:   HgA1c:

## 2017-06-02 NOTE — PROGRESS NOTE ADULT - SUBJECTIVE AND OBJECTIVE BOX
Patient is a 56y old  Female who presents with a chief complaint of chest pain (30 May 2017 14:44). NAD awating dc planning to [place]      INTERVAL HPI/OVERNIGHT EVENTS:  T(C): 36.9, Max: 36.9 (06-02 @ 06:16)  HR: 73 (63 - 73)  BP: 118/83 (87/47 - 118/83)  RR: 20 (16 - 20)  SpO2: 100% (100% - 100%)  Wt(kg): --  I&O's Summary    I & Os for current day (as of 02 Jun 2017 13:47)  =============================================  IN: 360 ml / OUT: 0 ml / NET: 360 ml      LABS:                        13.6   8.09  )-----------( 216      ( 02 Jun 2017 06:35 )             42.9     06-02    140  |  97<L>  |  29<H>  ----------------------------<  90  3.3<L>   |  31  |  1.11    Ca    9.2      02 Jun 2017 06:35  Phos  3.5     06-02  Mg     2.3     06-02      PT/INR - ( 02 Jun 2017 06:35 )   PT: 23.4 SEC;   INR: 2.06              CAPILLARY BLOOD GLUCOSE            MEDICATIONS  (STANDING):  furosemide    Tablet 40milliGRAM(s) Oral daily  aspirin enteric coated 81milliGRAM(s) Oral daily  bisacodyl 10milliGRAM(s) Oral at bedtime  QUEtiapine 25milliGRAM(s) Oral at bedtime  pantoprazole    Tablet 40milliGRAM(s) Oral before breakfast  warfarin 5milliGRAM(s) Oral once  potassium chloride    Tablet ER 40milliEquivalent(s) Oral every 4 hours    MEDICATIONS  (PRN):  aluminum hydroxide/magnesium hydroxide/simethicone Suspension 30milliLiter(s) Oral every 4 hours PRN Dyspepsia      REVIEW OF SYSTEMS:  CONSTITUTIONAL: No fever, weight loss, or fatigue  EYES: No eye pain, visual disturbances, or discharge  ENMT:  No difficulty hearing, tinnitus, vertigo; No sinus or throat pain  NECK: No pain or stiffness  RESPIRATORY: No cough, wheezing, chills or hemoptysis; No shortness of breath  CARDIOVASCULAR: No chest pain, palpitations, dizziness, or leg swelling  GASTROINTESTINAL: No abdominal or epigastric pain. No nausea, vomiting, or hematemesis; No diarrhea or constipation. No melena or hematochezia.  GENITOURINARY: No dysuria, frequency, hematuria, or incontinence  NEUROLOGICAL: No headaches, memory loss, loss of strength, numbness, or tremors  SKIN: No itching, burning, rashes, or lesions   LYMPH NODES: No enlarged glands  ENDOCRINE: No heat or cold intolerance; No hair loss  MUSCULOSKELETAL: No joint pain or swelling; No muscle, back, or extremity pain  PSYCHIATRIC: No depression, anxiety, mood swings, or difficulty sleeping  HEME/LYMPH: No easy bruising, or bleeding gums  ALLERY AND IMMUNOLOGIC: No hives or eczema    RADIOLOGY & ADDITIONAL TESTS:    Imaging Personally Reviewed:  [ ] YES  [ ] NO    Consultant(s) Notes Reviewed:  [ ] YES  [ ] NO    PHYSICAL EXAM:  GENERAL: NAD, well-groomed, well-developed  HEAD:  Atraumatic, Normocephalic  EYES: EOMI, PERRLA, conjunctiva and sclera clear  ENMT: No tonsillar erythema, exudates, or enlargement; Moist mucous membranes, Good dentition, No lesions  NECK: Supple, No JVD, Normal thyroid  NERVOUS SYSTEM:  Alert & Oriented X3, Good concentration; Motor Strength 5/5 B/L upper and lower extremities; DTRs 2+ intact and symmetric  CHEST/LUNG: Clear to percussion bilaterally; No rales, rhonchi, wheezing, or rubs  HEART: Regular rate and rhythm; No murmurs, rubs, or gallops  ABDOMEN: Soft, Nontender, Nondistended; Bowel sounds present  EXTREMITIES:  2+ Peripheral Pulses, No clubbing, cyanosis, or edema  LYMPH: No lymphadenopathy noted  SKIN: No rashes or lesions    Care Discussed with Consultants/Other Providers [ ] YES  [ ] NO

## 2017-06-02 NOTE — PROGRESS NOTE ADULT - ASSESSMENT
57 y/o F with h/o CAD s/p CABG, CHF, a.fib on coumadin, asthma admitted with chest pain    -CV stable  -colonscopy/egd ok   -cont lasix  -Afib : rate controlled , coumadin per INR ([facility]ADSVASC 3)  -continue with daily asa  d/c planning

## 2017-06-02 NOTE — PROGRESS NOTE ADULT - PROBLEM SELECTOR PROBLEM 1
ACS (acute coronary syndrome)
Chest pain, unspecified type

## 2017-06-03 DIAGNOSIS — I25.10 ATHEROSCLEROTIC HEART DISEASE OF NATIVE CORONARY ARTERY WITHOUT ANGINA PECTORIS: ICD-10-CM

## 2017-06-03 DIAGNOSIS — J45.20 MILD INTERMITTENT ASTHMA, UNCOMPLICATED: ICD-10-CM

## 2017-06-03 DIAGNOSIS — F33.9 MAJOR DEPRESSIVE DISORDER, RECURRENT, UNSPECIFIED: ICD-10-CM

## 2017-06-03 DIAGNOSIS — I50.22 CHRONIC SYSTOLIC (CONGESTIVE) HEART FAILURE: ICD-10-CM

## 2017-06-03 DIAGNOSIS — I48.2 CHRONIC ATRIAL FIBRILLATION: ICD-10-CM

## 2017-06-03 DIAGNOSIS — K63.5 POLYP OF COLON: ICD-10-CM

## 2017-06-03 PROCEDURE — 99223 1ST HOSP IP/OBS HIGH 75: CPT

## 2017-06-03 PROCEDURE — 99232 SBSQ HOSP IP/OBS MODERATE 35: CPT

## 2017-06-03 RX ADMIN — PANTOPRAZOLE SODIUM 40 MILLIGRAM(S): 20 TABLET, DELAYED RELEASE ORAL at 08:21

## 2017-06-03 RX ADMIN — Medication 40 MILLIGRAM(S): at 08:21

## 2017-06-03 RX ADMIN — WARFARIN SODIUM 5 MILLIGRAM(S): 2.5 TABLET ORAL at 17:27

## 2017-06-03 RX ADMIN — QUETIAPINE FUMARATE 25 MILLIGRAM(S): 200 TABLET, FILM COATED ORAL at 21:14

## 2017-06-03 NOTE — CONSULT NOTE ADULT - SUBJECTIVE AND OBJECTIVE BOX
History obtained from patient as well as review of previous [facility] medical records as summarized below.    HPI:   57 y/o F with CAD s/p CABG, chronic systolic heart failure, a fib on coumadin, asthma, depression who presented to [facility] with chest pain. Troponins negative, NST wnl. Deemed to be non cardiac chest pain. Also underwent screening EGD/colonoscopy in patient. EGD wnl, colonoscopy with two polyps, both removed, path pending. Now transferred to [facility] for psychiatric management.     Patient currently without chest pain. No complaints.     PAST MEDICAL & SURGICAL HISTORY:  Asthma  CAD (coronary artery disease)  Afib  CHF (congestive heart failure)  S/P thyroid surgery  S/P hip replacement, left  S/P CABG (coronary artery bypass graft)      Review of Systems:   CONSTITUTIONAL: No fever, weight loss, or fatigue  EYES: No eye pain, visual disturbances, or discharge  ENMT:  No difficulty hearing, tinnitus, vertigo; No sinus or throat pain  NECK: No pain or stiffness  BREASTS: No pain, masses, or nipple discharge  RESPIRATORY: No cough, wheezing, chills or hemoptysis; No shortness of breath  CARDIOVASCULAR: No chest pain, palpitations, dizziness, or leg swelling  GASTROINTESTINAL: No abdominal or epigastric pain. No nausea, vomiting, or hematemesis; No diarrhea or constipation. No melena or hematochezia.  GENITOURINARY: No dysuria, frequency, hematuria, or incontinence  NEUROLOGICAL: No headaches, memory loss, loss of strength, numbness, or tremors  SKIN: No itching, burning, rashes, or lesions   LYMPH NODES: No enlarged glands  ENDOCRINE: No heat or cold intolerance; No hair loss  MUSCULOSKELETAL: No joint pain or swelling; No muscle, back, or extremity pain  PSYCHIATRIC: No depression, anxiety, mood swings, or difficulty sleeping  HEME/LYMPH: No easy bruising, or bleeding gums  ALLERY AND IMMUNOLOGIC: No hives or eczema    Allergies  No Known Allergies    Social History:   Denies toxic habits    FAMILY HISTORY:  No pertinent family history in first degree relatives      MEDICATIONS  (STANDING):  QUEtiapine 25milliGRAM(s) Oral at bedtime  furosemide    Tablet 40milliGRAM(s) Oral daily  bisacodyl 10milliGRAM(s) Oral at bedtime  pantoprazole    Tablet 40milliGRAM(s) Oral before breakfast  aspirin enteric coated 81milliGRAM(s) Oral daily  warfarin 5milliGRAM(s) Oral daily    MEDICATIONS  (PRN):  aluminum hydroxide/magnesium hydroxide/simethicone Suspension 30milliLiter(s) Oral every 4 hours PRN Dyspepsia  ALBUTerol    90 MICROgram(s) HFA Inhaler 2Puff(s) Inhalation every 4 hours PRN asthma      Vital Signs Last 24 Hrs  T(C): 36.7, Max: 36.7 (06-03 @ 08:46)  HR: 80  BP: 135/65  RR: --  SpO2: 100% (100% - 100%)  Wt(kg): --  CAPILLARY BLOOD GLUCOSE    I&O's Summary      PHYSICAL EXAM:  GENERAL: NAD, well-developed  HEAD:  Atraumatic, Normocephalic  EYES: EOMI, PERRLA, conjunctiva and sclera clear  NECK: Supple, No JVD  CHEST/LUNG: Clear to auscultation bilaterally; No wheeze  HEART: Regular rate and rhythm; No murmurs, rubs, or gallops  ABDOMEN: Soft, Nontender, Nondistended; Bowel sounds present  EXTREMITIES:  2+ Peripheral Pulses, No clubbing, cyanosis, or edema  PSYCH: AAOx3  NEUROLOGY: non-focal  SKIN: No rashes or lesions    LABS:                        13.6   8.09  )-----------( 216      ( 02 Jun 2017 06:35 )             42.9     06-02    140  |  97<L>  |  29<H>  ----------------------------<  90  3.3<L>   |  31  |  1.11    Ca    9.2      02 Jun 2017 06:35  Phos  3.5     06-02  Mg     2.3     06-02      PT/INR - ( 02 Jun 2017 06:35 )   PT: 23.4 SEC;   INR: 2.06         EKG:    RADIOLOGY & ADDITIONAL TESTS:    Imaging Personally Reviewed:    Stress test -  Normal Study  *Myocardial Perfusion SPECT results are normal.  * Review of raw data shows: The study is of good technical  quality.  * The left ventricle was normal in size. Normal myocardial  perfusion scan,with no evidence of infarction or inducible  ischemia.  *Post-stress gated wall motion analysis was performed  (LVEF = 48 %;LVEDV = 79 ml.), revealing low normal LV  function.    TTE -   CONCLUSIONS:  1. Normal mitral valve. Mild mitral regurgitation.  2. Normal left ventricular internal dimensions and wall  thicknesses.  3. Normal left ventricular systolic function. No segmental  wall motion abnormalities.  4. Normal right ventricular size and function.  5. Estimated pulmonary artery systolic pressure equals 29  mm Hg, assuming right atrial pressure equals 10  mm Hg,  consistent with normal pulmonary pressures.Consultant(s) Notes Reviewed: History obtained from patient as well as review of previous [facility] medical records as summarized below.    HPI:   55 y/o F with CAD s/p CABG, chronic systolic heart failure, a fib on coumadin, asthma, depression who presented to [facility] with chest pain. Troponins negative, NST wnl. Deemed to be non cardiac chest pain. Also underwent screening EGD/colonoscopy in patient. EGD wnl, colonoscopy with two polyps, both removed, path pending. Now transferred to [facility] for psychiatric management.     Patient currently without chest pain. No complaints.     PAST MEDICAL & SURGICAL HISTORY:  Asthma  CAD (coronary artery disease)  Afib  CHF (congestive heart failure)  S/P thyroid surgery  S/P hip replacement, left  S/P CABG (coronary artery bypass graft)      Review of Systems:   CONSTITUTIONAL: No fever, weight loss, or fatigue  EYES: No eye pain, visual disturbances, or discharge  ENMT:  No difficulty hearing, tinnitus, vertigo; No sinus or throat pain  NECK: No pain or stiffness  BREASTS: No pain, masses, or nipple discharge  RESPIRATORY: No cough, wheezing, chills or hemoptysis; No shortness of breath  CARDIOVASCULAR: No chest pain, palpitations, dizziness, or leg swelling  GASTROINTESTINAL: No abdominal or epigastric pain. No nausea, vomiting, or hematemesis; No diarrhea or constipation. No melena or hematochezia.  GENITOURINARY: No dysuria, frequency, hematuria, or incontinence  NEUROLOGICAL: No headaches, memory loss, loss of strength, numbness, or tremors  SKIN: No itching, burning, rashes, or lesions   LYMPH NODES: No enlarged glands  ENDOCRINE: No heat or cold intolerance; No hair loss  MUSCULOSKELETAL: No joint pain or swelling; No muscle, back, or extremity pain  PSYCHIATRIC: No depression, anxiety, mood swings, or difficulty sleeping  HEME/LYMPH: No easy bruising, or bleeding gums  ALLERY AND IMMUNOLOGIC: No hives or eczema    Allergies  No Known Allergies    Social History:   Denies toxic habits    FAMILY HISTORY:  No pertinent family history in first degree relatives      MEDICATIONS  (STANDING):  QUEtiapine 25milliGRAM(s) Oral at bedtime  furosemide    Tablet 40milliGRAM(s) Oral daily  bisacodyl 10milliGRAM(s) Oral at bedtime  pantoprazole    Tablet 40milliGRAM(s) Oral before breakfast  aspirin enteric coated 81milliGRAM(s) Oral daily  warfarin 5milliGRAM(s) Oral daily    MEDICATIONS  (PRN):  aluminum hydroxide/magnesium hydroxide/simethicone Suspension 30milliLiter(s) Oral every 4 hours PRN Dyspepsia  ALBUTerol    90 MICROgram(s) HFA Inhaler 2Puff(s) Inhalation every 4 hours PRN asthma      Vital Signs Last 24 Hrs  T(C): 36.7, Max: 36.7 (06-03 @ 08:46)  HR: 80  BP: 135/65  RR: --  SpO2: 100% (100% - 100%)  Wt(kg): --  CAPILLARY BLOOD GLUCOSE    I&O's Summary      PHYSICAL EXAM:  GENERAL: NAD, well-developed  HEAD:  Atraumatic, Normocephalic  EYES: EOMI, PERRLA, conjunctiva and sclera clear  NECK: Supple, No JVD  CHEST/LUNG: Clear to auscultation bilaterally; No wheeze  HEART: Regular rate and rhythm; No murmurs, rubs, or gallops  ABDOMEN: Soft, Nontender, Nondistended; Bowel sounds present  EXTREMITIES:  2+ Peripheral Pulses, No clubbing, cyanosis, or edema  PSYCH: AAOx3  NEUROLOGY: non-focal  SKIN: No rashes or lesions    LABS:                        13.6   8.09  )-----------( 216      ( 02 Jun 2017 06:35 )             42.9     06-02    140  |  97<L>  |  29<H>  ----------------------------<  90  3.3<L>   |  31  |  1.11    Ca    9.2      02 Jun 2017 06:35  Phos  3.5     06-02  Mg     2.3     06-02      PT/INR - ( 02 Jun 2017 06:35 )   PT: 23.4 SEC;   INR: 2.06         RADIOLOGY & ADDITIONAL TESTS:    Imaging Personally Reviewed:    Stress test -  Normal Study  *Myocardial Perfusion SPECT results are normal.  * Review of raw data shows: The study is of good technical  quality.  * The left ventricle was normal in size. Normal myocardial  perfusion scan,with no evidence of infarction or inducible  ischemia.  *Post-stress gated wall motion analysis was performed  (LVEF = 48 %;LVEDV = 79 ml.), revealing low normal LV  function.    TTE -   CONCLUSIONS:  1. Normal mitral valve. Mild mitral regurgitation.  2. Normal left ventricular internal dimensions and wall  thicknesses.  3. Normal left ventricular systolic function. No segmental  wall motion abnormalities.  4. Normal right ventricular size and function.  5. Estimated pulmonary artery systolic pressure equals 29  mm Hg, assuming right atrial pressure equals 10  mm Hg,  consistent with normal pulmonary pressures.Consultant(s) Notes Reviewed:

## 2017-06-03 NOTE — CONSULT NOTE ADULT - ASSESSMENT
55 y/o F with CAD s/p CABG, chronic systolic heart failure, a fib on coumadin, asthma, depression who is admitted to [facility] for psychiatric management.

## 2017-06-04 PROCEDURE — 99232 SBSQ HOSP IP/OBS MODERATE 35: CPT

## 2017-06-04 RX ADMIN — ALBUTEROL 2 PUFF(S): 90 AEROSOL, METERED ORAL at 09:18

## 2017-06-04 RX ADMIN — PANTOPRAZOLE SODIUM 40 MILLIGRAM(S): 20 TABLET, DELAYED RELEASE ORAL at 09:18

## 2017-06-04 RX ADMIN — WARFARIN SODIUM 5 MILLIGRAM(S): 2.5 TABLET ORAL at 18:23

## 2017-06-04 RX ADMIN — Medication 81 MILLIGRAM(S): at 09:18

## 2017-06-04 RX ADMIN — Medication 40 MILLIGRAM(S): at 09:18

## 2017-06-04 RX ADMIN — QUETIAPINE FUMARATE 25 MILLIGRAM(S): 200 TABLET, FILM COATED ORAL at 21:09

## 2017-06-05 LAB
INR BLD: 1.42 — HIGH (ref 0.88–1.17)
PROTHROM AB SERPL-ACNC: 16 SEC — HIGH (ref 9.8–13.1)

## 2017-06-05 PROCEDURE — 99233 SBSQ HOSP IP/OBS HIGH 50: CPT

## 2017-06-05 PROCEDURE — 99232 SBSQ HOSP IP/OBS MODERATE 35: CPT

## 2017-06-05 RX ORDER — SIMETHICONE 80 MG/1
80 TABLET, CHEWABLE ORAL EVERY 6 HOURS
Qty: 0 | Refills: 0 | Status: DISCONTINUED | OUTPATIENT
Start: 2017-06-05 | End: 2017-06-07

## 2017-06-05 RX ORDER — WARFARIN SODIUM 2.5 MG/1
7 TABLET ORAL ONCE
Qty: 0 | Refills: 0 | Status: COMPLETED | OUTPATIENT
Start: 2017-06-05 | End: 2017-06-05

## 2017-06-05 RX ADMIN — Medication 40 MILLIGRAM(S): at 09:51

## 2017-06-05 RX ADMIN — PANTOPRAZOLE SODIUM 40 MILLIGRAM(S): 20 TABLET, DELAYED RELEASE ORAL at 09:51

## 2017-06-05 RX ADMIN — QUETIAPINE FUMARATE 25 MILLIGRAM(S): 200 TABLET, FILM COATED ORAL at 21:00

## 2017-06-05 RX ADMIN — WARFARIN SODIUM 7 MILLIGRAM(S): 2.5 TABLET ORAL at 18:36

## 2017-06-05 NOTE — PROGRESS NOTE ADULT - ASSESSMENT
57 y/o F with CAD s/p CABG, chronic systolic heart failure, a fib on coumadin, asthma, depression who is admitted to [facility] for psychiatric management.

## 2017-06-05 NOTE — PROGRESS NOTE ADULT - PROBLEM SELECTOR PLAN 4
HR controlled, cont coumadin, repeat INR testing HR controlled,  INR low today, increased coumadin 7mg, repeat INR in AM

## 2017-06-05 NOTE — PROGRESS NOTE ADULT - SUBJECTIVE AND OBJECTIVE BOX
CC/Reason for Consult: afib follow up INR    SUBJECTIVE / OVERNIGHT EVENTS: no event overnight    MEDICATIONS  (STANDING):  QUEtiapine 25milliGRAM(s) Oral at bedtime  furosemide    Tablet 40milliGRAM(s) Oral daily  pantoprazole    Tablet 40milliGRAM(s) Oral before breakfast  warfarin 5milliGRAM(s) Oral daily    MEDICATIONS  (PRN):  aluminum hydroxide/magnesium hydroxide/simethicone Suspension 30milliLiter(s) Oral every 4 hours PRN Dyspepsia  ALBUTerol    90 MICROgram(s) HFA Inhaler 2Puff(s) Inhalation every 4 hours PRN asthma  simethicone 80milliGRAM(s) Chew every 6 hours PRN Gas      Vital Signs Last 24 Hrs  T(C): 36.5, Max: 36.5 (06-04 @ 19:57)  HR: --  BP: --  RR: --  SpO2: --  Wt(kg): --  CAPILLARY BLOOD GLUCOSE    I&O's Summary      PHYSICAL EXAM:  GENERAL: NAD, well-developed  HEAD:  Atraumatic, Normocephalic  EYES: EOMI, PERRLA, conjunctiva and sclera clear  NECK: Supple, No JVD  CHEST/LUNG: Clear to auscultation bilaterally; No wheeze  HEART: Regular rate and rhythm; No murmurs, rubs, or gallops  ABDOMEN: Soft, Nontender, Nondistended; Bowel sounds present  EXTREMITIES:  2+ Peripheral Pulses, No clubbing, cyanosis, or edema  PSYCH: AAOx3  NEUROLOGY: non-focal  SKIN: No rashes or lesions    LABS:      INR pending              RADIOLOGY & ADDITIONAL TESTS:    Imaging Personally Reviewed:    Consultant(s) Notes Reviewed:      Care Discussed with Consultants/Other Providers: CC/Reason for Consult: afib follow up INR    SUBJECTIVE / OVERNIGHT EVENTS: no event overnight, reported compliant with coumadin    MEDICATIONS  (STANDING):  QUEtiapine 25milliGRAM(s) Oral at bedtime  furosemide    Tablet 40milliGRAM(s) Oral daily  pantoprazole    Tablet 40milliGRAM(s) Oral before breakfast  warfarin 5milliGRAM(s) Oral daily    MEDICATIONS  (PRN):  aluminum hydroxide/magnesium hydroxide/simethicone Suspension 30milliLiter(s) Oral every 4 hours PRN Dyspepsia  ALBUTerol    90 MICROgram(s) HFA Inhaler 2Puff(s) Inhalation every 4 hours PRN asthma  simethicone 80milliGRAM(s) Chew every 6 hours PRN Gas      Vital Signs Last 24 Hrs  T(C): 36.5, Max: 36.5 (06-04 @ 19:57)  HR: --  BP: --  RR: --  SpO2: --  Wt(kg): --  CAPILLARY BLOOD GLUCOSE    I&O's Summary      PHYSICAL EXAM:  GENERAL: NAD, well-developed  HEAD:  Atraumatic, Normocephalic  EYES: EOMI, PERRLA, conjunctiva and sclera clear  NECK: Supple, No JVD  CHEST/LUNG: Clear to auscultation bilaterally; No wheeze  HEART: Regular rate and rhythm; No murmurs, rubs, or gallops  ABDOMEN: Soft, Nontender, Nondistended; Bowel sounds present  EXTREMITIES:  2+ Peripheral Pulses, No clubbing, cyanosis, or edema  PSYCH: calm  NEUROLOGY: non-focal  SKIN: No rashes or lesions    LABS:      INR: 1.42 (06.05.17 @ 13:54)    INR pending              RADIOLOGY & ADDITIONAL TESTS:    Imaging Personally Reviewed:    Consultant(s) Notes Reviewed:      Care Discussed with Consultants/Other Providers:

## 2017-06-06 LAB
INR BLD: 1.4 — HIGH (ref 0.88–1.17)
PROTHROM AB SERPL-ACNC: 15.8 SEC — HIGH (ref 9.8–13.1)
SURGICAL PATHOLOGY STUDY: SIGNIFICANT CHANGE UP

## 2017-06-06 PROCEDURE — 99232 SBSQ HOSP IP/OBS MODERATE 35: CPT

## 2017-06-06 PROCEDURE — 99233 SBSQ HOSP IP/OBS HIGH 50: CPT

## 2017-06-06 RX ORDER — WARFARIN SODIUM 2.5 MG/1
10 TABLET ORAL ONCE
Qty: 0 | Refills: 0 | Status: COMPLETED | OUTPATIENT
Start: 2017-06-06 | End: 2017-06-06

## 2017-06-06 RX ORDER — FUROSEMIDE 40 MG
40 TABLET ORAL
Qty: 0 | Refills: 0 | Status: DISCONTINUED | OUTPATIENT
Start: 2017-06-06 | End: 2017-06-07

## 2017-06-06 RX ADMIN — WARFARIN SODIUM 10 MILLIGRAM(S): 2.5 TABLET ORAL at 17:19

## 2017-06-06 RX ADMIN — Medication 40 MILLIGRAM(S): at 08:35

## 2017-06-06 RX ADMIN — SIMETHICONE 80 MILLIGRAM(S): 80 TABLET, CHEWABLE ORAL at 15:32

## 2017-06-06 RX ADMIN — QUETIAPINE FUMARATE 25 MILLIGRAM(S): 200 TABLET, FILM COATED ORAL at 20:40

## 2017-06-06 RX ADMIN — SIMETHICONE 80 MILLIGRAM(S): 80 TABLET, CHEWABLE ORAL at 22:15

## 2017-06-06 NOTE — PROGRESS NOTE ADULT - SUBJECTIVE AND OBJECTIVE BOX
CC/Reason for Consult: follow up INR    SUBJECTIVE / OVERNIGHT EVENTS: pt self reported compliant with coumadin, confirmed with RN    MEDICATIONS  (STANDING):  QUEtiapine 25milliGRAM(s) Oral at bedtime  pantoprazole    Tablet 40milliGRAM(s) Oral before breakfast  warfarin 10milliGRAM(s) Oral once  furosemide    Tablet 40milliGRAM(s) Oral <User Schedule>    MEDICATIONS  (PRN):  aluminum hydroxide/magnesium hydroxide/simethicone Suspension 30milliLiter(s) Oral every 4 hours PRN Dyspepsia  ALBUTerol    90 MICROgram(s) HFA Inhaler 2Puff(s) Inhalation every 4 hours PRN asthma  simethicone 80milliGRAM(s) Chew every 6 hours PRN Gas      Vital Signs Last 24 Hrs  T(C): 36.7, Max: 36.7 (06-05 @ 19:23)  HR: 89 (89 - 89)  BP: 132/76 (132/76 - 132/76)  RR: --  SpO2: --  Wt(kg): --  CAPILLARY BLOOD GLUCOSE    I&O's Summary      PHYSICAL EXAM:  GENERAL: NAD, well-developed  HEAD:  Atraumatic, Normocephalic  EYES: EOMI, PERRLA, conjunctiva and sclera clear  NECK: Supple, No JVD  CHEST/LUNG: Clear to auscultation bilaterally; No wheeze  HEART: Regular rate and rhythm; No murmurs, rubs, or gallops  ABDOMEN: Soft, Nontender, Nondistended; Bowel sounds present  EXTREMITIES:  2+ Peripheral Pulses, No clubbing, cyanosis, or edema  PSYCH: AAOx3, calm  NEUROLOGY: non-focal  SKIN: No rashes or lesions    LABS:          PT/INR - ( 06 Jun 2017 08:30 )   PT: 15.8 SEC;   INR: 1.40                    RADIOLOGY & ADDITIONAL TESTS:    Imaging Personally Reviewed:    Consultant(s) Notes Reviewed:      Care Discussed with Consultants/Other Providers:

## 2017-06-06 NOTE — PROGRESS NOTE ADULT - ASSESSMENT
57 y/o F with CAD s/p CABG, chronic systolic heart failure, a fib on coumadin, asthma, depression who is admitted to [facility] for psychiatric management.

## 2017-06-06 NOTE — PROGRESS NOTE ADULT - PROBLEM SELECTOR PLAN 4
HR controlled,  INR low today, increased coumadin 10mg, repeat INR in AM  Pt self reported INR fluctuated very often, she sometimes on 10-7mg alternative days.

## 2017-06-07 VITALS — TEMPERATURE: 98 F

## 2017-06-07 PROCEDURE — 99232 SBSQ HOSP IP/OBS MODERATE 35: CPT

## 2017-06-07 RX ORDER — QUETIAPINE FUMARATE 200 MG/1
1 TABLET, FILM COATED ORAL
Qty: 30 | Refills: 0 | OUTPATIENT
Start: 2017-06-07 | End: 2017-07-07

## 2017-06-07 RX ORDER — WARFARIN SODIUM 2.5 MG/1
1 TABLET ORAL
Qty: 2 | Refills: 0 | OUTPATIENT
Start: 2017-06-07 | End: 2017-06-09

## 2017-06-07 RX ORDER — PANTOPRAZOLE SODIUM 20 MG/1
1 TABLET, DELAYED RELEASE ORAL
Qty: 30 | Refills: 0 | OUTPATIENT
Start: 2017-06-07 | End: 2017-07-07

## 2017-06-07 RX ORDER — ALBUTEROL 90 UG/1
2 AEROSOL, METERED ORAL
Qty: 1 | Refills: 0 | OUTPATIENT
Start: 2017-06-07 | End: 2017-07-07

## 2017-06-07 RX ORDER — FUROSEMIDE 40 MG
1 TABLET ORAL
Qty: 30 | Refills: 0 | OUTPATIENT
Start: 2017-06-07 | End: 2017-07-07

## 2017-06-07 RX ORDER — FUROSEMIDE 40 MG
1 TABLET ORAL
Qty: 0 | Refills: 0 | COMMUNITY

## 2017-06-07 RX ADMIN — SIMETHICONE 80 MILLIGRAM(S): 80 TABLET, CHEWABLE ORAL at 12:15

## 2017-06-07 RX ADMIN — PANTOPRAZOLE SODIUM 40 MILLIGRAM(S): 20 TABLET, DELAYED RELEASE ORAL at 08:34

## 2017-06-07 NOTE — PROGRESS NOTE ADULT - SUBJECTIVE AND OBJECTIVE BOX
CC/Reason for Consult: f/u INR    SUBJECTIVE / OVERNIGHT EVENTS: Pt refused INR check today, wants to go home and follow up with PCP. Reported by RN was compliant with coumadin    MEDICATIONS  (STANDING):  QUEtiapine 25milliGRAM(s) Oral at bedtime  pantoprazole    Tablet 40milliGRAM(s) Oral before breakfast  furosemide    Tablet 40milliGRAM(s) Oral <User Schedule>    MEDICATIONS  (PRN):  aluminum hydroxide/magnesium hydroxide/simethicone Suspension 30milliLiter(s) Oral every 4 hours PRN Dyspepsia  ALBUTerol    90 MICROgram(s) HFA Inhaler 2Puff(s) Inhalation every 4 hours PRN asthma  simethicone 80milliGRAM(s) Chew every 6 hours PRN Gas      Vital Signs Last 24 Hrs  T(C): 36.8, Max: 36.8 (06-07 @ 06:39)  HR: --  BP: --  RR: --  SpO2: --  Wt(kg): --  CAPILLARY BLOOD GLUCOSE    I&O's Summary      PHYSICAL EXAM:  GENERAL: NAD, well-developed  HEAD:  Atraumatic, Normocephalic  EYES: EOMI, PERRLA, conjunctiva and sclera clear  NECK: Supple, No JVD  CHEST/LUNG: Clear to auscultation bilaterally; No wheeze  HEART: Regular rate and rhythm; No murmurs, rubs, or gallops  ABDOMEN: Soft, Nontender, Nondistended; Bowel sounds present  EXTREMITIES:  2+ Peripheral Pulses, No clubbing, cyanosis, or edema  PSYCH: AAOx3  NEUROLOGY: non-focal  SKIN: No rashes or lesions    LABS:          PT/INR - ( 06 Jun 2017 08:30 )   PT: 15.8 SEC;   INR: 1.40                    RADIOLOGY & ADDITIONAL TESTS:    Imaging Personally Reviewed:    Consultant(s) Notes Reviewed:      Care Discussed with Consultants/Other Providers:

## 2017-06-07 NOTE — PROGRESS NOTE ADULT - PROBLEM SELECTOR PROBLEM 6
Polyp of colon, unspecified part of colon, unspecified type

## 2017-06-07 NOTE — PROGRESS NOTE ADULT - ASSESSMENT
55 y/o F with CAD s/p CABG, chronic systolic heart failure, a fib on coumadin, asthma, depression who is admitted to [facility] for psychiatric management.

## 2017-06-07 NOTE — PROGRESS NOTE ADULT - PROBLEM SELECTOR PLAN 4
HR controlled,  Pt wants to follow up with PCP for INR check, follow up appointment will be make by MARIA DEL ROSARIO matamoros, recommended coumadin 7 mg on D/C, repeat INR in AM with PCP  Pt self reported INR fluctuated very often, she sometimes on 10-7mg alternative days.

## 2017-06-07 NOTE — PROGRESS NOTE ADULT - PROBLEM SELECTOR PLAN 2
continue lasix, no symptoms of fluid overload  repeat TTE showed normal LVEF

## 2017-06-07 NOTE — PROGRESS NOTE ADULT - ATTENDING COMMENTS
verbally explained above plan with Pt, she agreed to see PCP for INR check up in AM, She expressed fully understand the risk of taking coumadin with either supertheraputic INR or subtherapeutic INR. Wants to follow up out patient. Pt AAO*3, clearly demonstrate understanding during the conversation.
discussed with Pt and family

## 2017-06-07 NOTE — PROGRESS NOTE ADULT - PROBLEM SELECTOR PLAN 1
management as per psych team, on Seroquel

## 2017-06-07 NOTE — PROGRESS NOTE ADULT - PROBLEM SELECTOR PLAN 3
recent stress/Echo non-significant findings

## 2017-06-07 NOTE — PROGRESS NOTE ADULT - PROBLEM SELECTOR PROBLEM 2
Chronic systolic congestive heart failure

## 2017-06-07 NOTE — PROGRESS NOTE ADULT - PROBLEM SELECTOR PLAN 6
s/p colonoscope, f/u Biopsy results

## 2017-08-21 ENCOUNTER — OUTPATIENT (OUTPATIENT)
Dept: OUTPATIENT SERVICES | Facility: HOSPITAL | Age: 57
LOS: 1 days | Discharge: STILL ADMITTED OR ADMINISTRATIVE | End: 2017-08-21

## 2017-08-21 DIAGNOSIS — Z98.890 OTHER SPECIFIED POSTPROCEDURAL STATES: Chronic | ICD-10-CM

## 2017-08-21 DIAGNOSIS — Z95.1 PRESENCE OF AORTOCORONARY BYPASS GRAFT: Chronic | ICD-10-CM

## 2017-08-21 DIAGNOSIS — Z96.642 PRESENCE OF LEFT ARTIFICIAL HIP JOINT: Chronic | ICD-10-CM

## 2017-08-22 DIAGNOSIS — F29 UNSPECIFIED PSYCHOSIS NOT DUE TO A SUBSTANCE OR KNOWN PHYSIOLOGICAL CONDITION: ICD-10-CM

## 2017-10-28 NOTE — BEHAVIORAL HEALTH ASSESSMENT NOTE - NSBHPSYCHMEDSHX_PSY_A_CORE
Patient : Prema Armas Age: 82 year old Sex: female   MRN: [id number] Encounter Date: 10/26/2017      History     No chief complaint on file.    82-year-old  female presents to the emergency department by ambulance with complaints of head neck and left-sided chest pain after sustaining a mechanical fall from her wheelchair. Per EMS received a call to a wheelchair van regarding a patient who had fallen from their wheelchair in the vehicle injuring her head neck and ribs. Patient was transported without incident and upon arrival speaking with both the patient as well as the patient's daughter states that patient was attempting to  her cell phone from the side of her wheelchair when she and buckled her restraints and causing her to fall to the ground. Patient denies loss of consciousness or other injuries. Daughter states patient suffers from left hemiplegia due to a distant stroke.            Allergies   Allergen Reactions   • Lexapro HIVES       Discharge Medication List as of 10/26/2017  3:46 PM          Discharge Medication List as of 10/26/2017  3:46 PM      START taking these medications    Details   HYDROcodone-acetaminophen (NORCO) 5-325 MG per tablet Take 0.5-1 tablets by mouth every 6 hours as needed for Pain. Do not drive  May become habit-formingNormal, Disp-20 tablet, R-0             No past medical history on file.    No past surgical history on file.    No family history on file.    Social History   Substance Use Topics   • Smoking status: Former Smoker   • Smokeless tobacco: Never Used   • Alcohol use No       Review of Systems   Constitutional: Negative for activity change, appetite change, chills and fever.   HENT: Negative for congestion, ear pain, rhinorrhea, sinus pressure, sore throat and trouble swallowing.    Eyes: Negative for pain, discharge, redness and visual disturbance.   Respiratory: Negative for cough, chest tightness, shortness of breath and wheezing.    Cardiovascular:  Positive for chest pain. Negative for leg swelling.   Gastrointestinal: Negative for abdominal distention, abdominal pain, constipation, diarrhea, nausea and vomiting.   Genitourinary: Negative for decreased urine volume, difficulty urinating, dysuria, frequency and urgency.   Musculoskeletal: Positive for neck pain. Negative for back pain, gait problem, myalgias and neck stiffness.   Skin: Negative for color change and wound.   Neurological: Positive for headaches. Negative for dizziness, weakness, light-headedness and numbness.   Hematological: Negative for adenopathy.   Psychiatric/Behavioral: Negative for confusion and sleep disturbance. The patient is not nervous/anxious.        Physical Exam     ED Triage Vitals   ED Triage Vitals Group      Temp 10/26/17 1240 97.1 °F (36.2 °C)      Pulse 10/26/17 1227 70      Resp 10/26/17 1240 20      BP 10/26/17 1227 119/57      SpO2 10/26/17 1227 94 %      EtCO2 mmHg --       Height 10/26/17 1240 5' 6\" (1.676 m)      Weight 10/26/17 1240 240 lb (108.9 kg)      Weight Scale Used --        Physical Exam   Constitutional: She is oriented to person, place, and time. She appears well-developed and well-nourished.  Non-toxic appearance. She does not have a sickly appearance. She appears distressed.   HENT:   Head: Normocephalic and atraumatic.   Ears:    Nose: Nose normal.   Mouth/Throat: Uvula is midline, oropharynx is clear and moist and mucous membranes are normal. Mucous membranes are not dry. No oropharyngeal exudate or posterior oropharyngeal erythema.   Eyes: Conjunctivae and EOM are normal. Pupils are equal, round, and reactive to light. Right eye exhibits no discharge. Left eye exhibits no discharge. No scleral icterus.   Neck: Trachea normal, normal range of motion and full passive range of motion without pain. Neck supple. No JVD present. No tracheal deviation present.   Cardiovascular: Normal rate, regular rhythm, normal heart sounds, intact distal pulses and normal  pulses.    Pulmonary/Chest: Effort normal and breath sounds normal. No accessory muscle usage or stridor. No tachypnea. No respiratory distress. She has no wheezes. She has no rales. She exhibits tenderness and bony tenderness.       Abdominal: Soft. Normal appearance and bowel sounds are normal. She exhibits no distension. There is no splenomegaly or hepatomegaly. There is no tenderness. There is no rigidity, no rebound, no guarding, no CVA tenderness, no tenderness at McBurney's point and negative Gould's sign.   Musculoskeletal: Normal range of motion. She exhibits no edema or tenderness.   Neurological: She is alert and oriented to person, place, and time. She is not disoriented. She exhibits abnormal muscle tone (left-sided). Coordination normal.   Skin: Skin is warm, dry and intact. No ecchymosis and no rash noted. She is not diaphoretic. No cyanosis or erythema. No pallor.   Psychiatric: She has a normal mood and affect. Her behavior is normal. Judgment and thought content normal.   Nursing note and vitals reviewed.      ED Course     Procedures    Lab Results     No results found for this visit on 10/26/17.    EKG Results         Radiology Results     Imaging Results          XR Ribs Left w PA Chest (Final result)  Result time 10/26/17 14:12:07    Final result                 Impression:    IMPRESSION:      Left lateral seventh and eighth rib fractures with small left pleural  effusion.               Narrative:          EXAM: XR RIBS LEFT W PA CHEST    CLINICAL INDICATION: fall left sided chest pain      There is a small left pleural effusion superimposed on mild fibrosis and  scarring. There is no pneumothorax. The heart and mediastinum show findings  from median sternotomy with mild to moderate tortuosity of the aorta. There  are acute nondisplaced left lateral seventh and eighth rib fractures. There  are old left first and second rib fractures. There is mild to moderate  shoulder arthritis.                                 XR Shoulder 3 View Left (Final result)  Result time 10/26/17 14:13:56    Final result                 Impression:    IMPRESSION:  1. No acute fracture or dislocation.  2. Osteophytosis and degenerative changes of the glenohumeral joint without  significant joint space loss.               Narrative:    HISTORY: Fall. Shoulder pain.    EXAM: 3 views of the left shoulder.    COMPARISON: None.    FINDINGS: There is no fracture or malalignment. The cortex is intact. There  is mild degenerative osteophytosis of the glenohumeral joint without  significant joint space loss.                               CT Cervical Spine (Final result)  Result time 10/26/17 14:12:46    Final result                 Impression:    IMPRESSION:  1. No acute fracture, acute subluxation, or endplate depression.  2. Multilevel degenerative disc disease, most significant at C5-C6 and at  C6-C7 where there is significant endplate degenerative change and disc  height loss.               Narrative:    HISTORY: Fall. Trauma.    EXAM: A noncontrast CT scan of the cervical spine was performed with thin  cut axial images. Multiplanar reformatted images were reviewed at a  dedicated workstation.    COMPARISON: None.    FINDINGS: There is no acute fracture or endplate depression. The alignment  is grossly within normal limits, with mild straightening of the mid  cervical spine. There is no prevertebral soft tissue swelling. Central  canal is patent. The lateral masses align on coronal imaging and there is  no perched facet. There is endplate degenerative changes and significant  disc height loss at C5-C6 and at C6-C7. There are osteophytes scattered on  additional vertebral bodies throughout the cervical spine.                               CT Head Brain (Final result)  Result time 10/26/17 13:47:38    Final result                 Impression:    IMPRESSION:    Atrophy, old infarct and white matter small vessel disease without  acute  intracranial finding.                      Narrative:          EXAM: CT HEAD WO CONTRAST    CLINICAL INDICATION: HEADACHE, POST TRAUMA    TECHNIQUE: Multiple transaxial images of the head were obtained without  intravenous contrast material.    FINDINGS:    Ventricles and extra-axial spaces: There is moderate prominence indicating  atrophy.    Hemorrhage: None.    Cerebral parenchyma: No evidence of acute cortical infarct. There is  moderate right middle cerebral artery distribution encephalomalacia from  old infarct. There are moderate periventricular and subcortical white  matter hypodensities thought to indicate white matter small vessel disease.    Midline shift: None. No mass.    Cerebellum: Normal.    Brainstem: Normal.    Vascular system: There is moderate cavernous carotid atherosclerosis.    Skull and facial bones: Normal.                                    ED Medication Orders     Start Ordered     Status Ordering Provider    10/26/17 1536 10/26/17 1535  HYDROcodone-acetaminophen (NORCO) 5-325 MG per tablet 1 tablet  ONCE      Last MAR action:  Given RAI DOMINGO    10/26/17 1300 10/26/17 1259  acetaminophen (TYLENOL) tablet 650 mg  ONCE      Last MAR action:  Given RAI DOMINGO          MDM  Number of Diagnoses or Management Options  Closed fracture of multiple ribs of left side, initial encounter: new, needed workup     Amount and/or Complexity of Data Reviewed  Tests in the radiology section of CPT®: ordered and reviewed  Decide to obtain previous medical records or to obtain history from someone other than the patient: yes  Obtain history from someone other than the patient: yes  Review and summarize past medical records: yes  Independent visualization of images, tracings, or specimens: yes    Risk of Complications, Morbidity, and/or Mortality  Presenting problems: high  Management options: moderate  General comments: Radiology tests were reviewed and agree with the  interpretation      Critical Care  Total time providing critical care: < 30 minutes    Patient Progress  Patient progress: improved      Clinical Impression     ED Diagnosis   1. Closed fracture of multiple ribs of left side, initial encounter         Disposition        Discharge 10/26/2017  3:36 PM  Prema Armas discharge to home/self care.             Patient reevaluated spoke the patient as well as the patient's daughter regarding x-ray as well as physical exam findings. Informed daughter of the nondisplaced rib fractures and that to prevent development of pneumonia would provide patient with incentive spirometry instructions. Informed daughter that we would be discharging patient home with additional instructions for the nursing home where patient resides as well as providing him with resources to follow-up with cardiothoracic surgery for reevaluation. Informed daughter that prompt follow-up with the patient's primary care provider would be warranted within the next 3-4 days which he stated verbal understanding and accepts the plan.       Chester De Dios, DO  10/28/17 0605     yes...

## 2018-03-29 NOTE — PRE-OP CHECKLIST - SIDE RAILS UP
patient being sent in by [facility] for further evaluation of a possible "fibula fracture". patient presents with knee immobilizer and crutches. patient reports s/p fall yesterday after jumping over a ditch.
done

## 2023-11-13 NOTE — ED ADULT NURSE NOTE - BREATHING, MLM
1) Continue all current medications  - fasting labs  Track BP   2) Exercise daily at least 20-30 minutes  3) The following lifestyle modifications are encouraged: good job with not smoking  4) Low-fat, low cholesterol diet less than 1500 mg (2/3 teaspoon) sodium daily  5) READ:  Stretching to Stay Young: Simple Workouts to Keep You Flexible, Energized, and Pain Free (by Alba Shields)  6) Follow-up in 6 months or sooner if needed.    Jamin Brambila MD, MS, [facility]  12/5/2022     Thank you for visiting with us today, we appreciate your time.  I hope we addressed any concerns you may have had.  Please feel free to contact us with any other questions.   Spontaneous, unlabored and symmetrical Not applicable